# Patient Record
Sex: MALE | Race: WHITE | Employment: FULL TIME | URBAN - METROPOLITAN AREA
[De-identification: names, ages, dates, MRNs, and addresses within clinical notes are randomized per-mention and may not be internally consistent; named-entity substitution may affect disease eponyms.]

---

## 2017-12-19 ENCOUNTER — HOSPITAL ENCOUNTER (OUTPATIENT)
Dept: CARDIAC CATH/INVASIVE PROCEDURES | Age: 57
Discharge: HOME | End: 2017-12-19
Payer: COMMERCIAL

## 2017-12-19 VITALS
HEIGHT: 75 IN | TEMPERATURE: 98.1 F | OXYGEN SATURATION: 96 % | BODY MASS INDEX: 39.17 KG/M2 | DIASTOLIC BLOOD PRESSURE: 81 MMHG | WEIGHT: 315 LBS | HEART RATE: 85 BPM | RESPIRATION RATE: 14 BRPM | SYSTOLIC BLOOD PRESSURE: 133 MMHG

## 2017-12-19 DIAGNOSIS — I49.3 PVC (PREMATURE VENTRICULAR CONTRACTION): Chronic | ICD-10-CM

## 2017-12-19 DIAGNOSIS — I77.810 AORTIC ROOT DILATATION (HCC): Primary | ICD-10-CM

## 2017-12-19 DIAGNOSIS — R94.39 ABNORMAL NUCLEAR STRESS TEST: ICD-10-CM

## 2017-12-19 DIAGNOSIS — I71.21 ASCENDING AORTIC ANEURYSM: Chronic | ICD-10-CM

## 2017-12-19 DIAGNOSIS — I42.8 NONISCHEMIC CARDIOMYOPATHY (HCC): Chronic | ICD-10-CM

## 2017-12-19 DIAGNOSIS — I71.21 AORTIC ROOT ANEURYSM: Chronic | ICD-10-CM

## 2017-12-19 PROBLEM — I42.9 CARDIOMYOPATHY (HCC): Status: ACTIVE | Noted: 2017-12-19

## 2017-12-19 PROBLEM — Q25.43 AORTIC ROOT ANEURYSM: Chronic | Status: ACTIVE | Noted: 2017-12-19

## 2017-12-19 LAB
GFR NON-AFRICAN AMERICAN: >60 ML/MIN
GFR SERPL CREATININE-BSD FRML MDRD: >60 ML/MIN
GFR SERPL CREATININE-BSD FRML MDRD: NORMAL ML/MIN/{1.73_M2}
GLUCOSE BLD-MCNC: 95 MG/DL (ref 74–100)
LV EF: 25 %
LVEF MODALITY: NORMAL
PLATELET # BLD: 258 K/UL (ref 138–453)
POC CHLORIDE: 103 MMOL/L (ref 98–107)
POC CREATININE: 0.76 MG/DL (ref 0.51–1.19)
POC HEMATOCRIT: 42 % (ref 41–53)
POC HEMOGLOBIN: 14.2 G/DL (ref 13.5–17.5)
POC POTASSIUM: 4.3 MMOL/L (ref 3.5–4.5)
POC SODIUM: 140 MMOL/L (ref 138–146)

## 2017-12-19 PROCEDURE — C1769 GUIDE WIRE: HCPCS

## 2017-12-19 PROCEDURE — 85049 AUTOMATED PLATELET COUNT: CPT

## 2017-12-19 PROCEDURE — 85014 HEMATOCRIT: CPT

## 2017-12-19 PROCEDURE — 93567 NJX CAR CTH SPRVLV AORTGRPHY: CPT | Performed by: INTERNAL MEDICINE

## 2017-12-19 PROCEDURE — 7100000011 HC PHASE II RECOVERY - ADDTL 15 MIN

## 2017-12-19 PROCEDURE — C1894 INTRO/SHEATH, NON-LASER: HCPCS

## 2017-12-19 PROCEDURE — C1760 CLOSURE DEV, VASC: HCPCS

## 2017-12-19 PROCEDURE — 82947 ASSAY GLUCOSE BLOOD QUANT: CPT

## 2017-12-19 PROCEDURE — 6360000002 HC RX W HCPCS

## 2017-12-19 PROCEDURE — 84132 ASSAY OF SERUM POTASSIUM: CPT

## 2017-12-19 PROCEDURE — 82435 ASSAY OF BLOOD CHLORIDE: CPT

## 2017-12-19 PROCEDURE — 93458 L HRT ARTERY/VENTRICLE ANGIO: CPT | Performed by: INTERNAL MEDICINE

## 2017-12-19 PROCEDURE — 84295 ASSAY OF SERUM SODIUM: CPT

## 2017-12-19 PROCEDURE — C1725 CATH, TRANSLUMIN NON-LASER: HCPCS

## 2017-12-19 PROCEDURE — 7100000010 HC PHASE II RECOVERY - FIRST 15 MIN

## 2017-12-19 PROCEDURE — 82565 ASSAY OF CREATININE: CPT

## 2017-12-19 RX ORDER — ACETAMINOPHEN 325 MG/1
650 TABLET ORAL EVERY 4 HOURS PRN
Status: DISCONTINUED | OUTPATIENT
Start: 2017-12-19 | End: 2017-12-20 | Stop reason: HOSPADM

## 2017-12-19 RX ORDER — SODIUM CHLORIDE 0.9 % (FLUSH) 0.9 %
10 SYRINGE (ML) INJECTION PRN
Status: DISCONTINUED | OUTPATIENT
Start: 2017-12-19 | End: 2017-12-20 | Stop reason: HOSPADM

## 2017-12-19 RX ORDER — CARVEDILOL 3.12 MG/1
3.12 TABLET ORAL 2 TIMES DAILY
Qty: 60 TABLET | Refills: 3 | Status: ON HOLD | OUTPATIENT
Start: 2017-12-19 | End: 2021-01-21 | Stop reason: HOSPADM

## 2017-12-19 RX ORDER — ONDANSETRON 2 MG/ML
4 INJECTION INTRAMUSCULAR; INTRAVENOUS EVERY 6 HOURS PRN
Status: DISCONTINUED | OUTPATIENT
Start: 2017-12-19 | End: 2017-12-20 | Stop reason: HOSPADM

## 2017-12-19 RX ORDER — LISINOPRIL 5 MG/1
5 TABLET ORAL DAILY
Qty: 30 TABLET | Refills: 3 | Status: ON HOLD | OUTPATIENT
Start: 2017-12-19 | End: 2021-01-21 | Stop reason: HOSPADM

## 2017-12-19 RX ORDER — SODIUM CHLORIDE 0.9 % (FLUSH) 0.9 %
10 SYRINGE (ML) INJECTION EVERY 12 HOURS SCHEDULED
Status: DISCONTINUED | OUTPATIENT
Start: 2017-12-19 | End: 2017-12-20 | Stop reason: HOSPADM

## 2017-12-19 RX ORDER — LISINOPRIL 2.5 MG/1
2.5 TABLET ORAL DAILY
COMMUNITY
End: 2017-12-19

## 2017-12-19 RX ORDER — SODIUM CHLORIDE 9 MG/ML
INJECTION, SOLUTION INTRAVENOUS CONTINUOUS
Status: DISCONTINUED | OUTPATIENT
Start: 2017-12-19 | End: 2017-12-20 | Stop reason: HOSPADM

## 2017-12-19 RX ORDER — ATORVASTATIN CALCIUM 40 MG/1
40 TABLET, FILM COATED ORAL DAILY
COMMUNITY

## 2017-12-19 RX ADMIN — SODIUM CHLORIDE: 9 INJECTION, SOLUTION INTRAVENOUS at 12:50

## 2017-12-19 ASSESSMENT — PAIN SCALES - GENERAL: PAINLEVEL_OUTOF10: 0

## 2017-12-19 NOTE — PROGRESS NOTES
All discharge instructions reviewed, questions answered, paper signed and given copy. Patient discharged per ambulatory with family and belongings.

## 2017-12-19 NOTE — H&P
[facility]  Procedure History and Physical Update          Patient Name:  Jose Burton  MRN:    [id number]  YOB: 1960  Date of evaluation:  12/19/2017      Please refer to the consult note / H&P completed by Dr. Dann Gaming on 12/14/17 in the medical record and note that:       [x] I have examined the patient and reviewed the H&P/Consult and there are no changes to be made to the assessment or plan. [] I have examined the patient and reviewed the H&P/Consult and have noted the following changes:         Past Medical History:   Diagnosis Date    Arthritis     Hyperlipidemia     S/P cardiac cath 12/19/2017       Past Surgical History:   Procedure Laterality Date    JOINT REPLACEMENT Bilateral     knee       Prior to Admission medications    Medication Sig Start Date End Date Taking? Authorizing Provider   aspirin 81 MG tablet Take 81 mg by mouth daily   Yes Historical Provider, MD   atorvastatin (LIPITOR) 40 MG tablet Take 40 mg by mouth daily   Yes Historical Provider, MD       Vitals:    12/19/17 1236   BP: (!) 144/79   Pulse: 78   Resp: 18   Temp: 98.1 °F (36.7 °C)   SpO2: 96%       Office Problem List    1. TTE (10/20/17): LVEF 35-40%. Severely dilated LV. Mild to mod concentric LVH. Mid anterior hypokinesis, Inferior base and mid segments hypokinetic. Mild to mod MR. Trace to mild AI. Aortic root is mod dilated, 4.8cm. Prox, ascending thoracic aorta mildly dilated, approx 4.3cm. Aortic arch mildly dilated, 3.9cm.   2. Lexiscan (11/29/17): Negative ECG portion. Small fixed inferior defect with no suggestion of ischemia. Markedly increased LVEDV with diffuse moderately severe to severe LT ventricular hypokinesis. LVEF 33%. 3. Dyslipidemia with Lipid panel (8/11/16): Total 152, HDL 30, LDL 93, Tri 144   4. Frequent PVCs       Plan:  1. Proceed with planned coronary angiography +/- RHC. 2. Further orders to follow.      Risk and benefits of cardiac catheterization were discussed in detail. Risk of bleeding, requiring blood transfusion, vascular complication requiring surgery, renal insufficieny with need of dialysis, CVA, MI, death and anesthesia complications including intubation were discussed. Patient agrees to proceed and verbalizes understanding.         Mago Ann MD  Fellow, [address]

## 2018-01-24 ENCOUNTER — INITIAL CONSULT (OUTPATIENT)
Dept: CARDIOTHORACIC SURGERY | Age: 58
End: 2018-01-24
Payer: COMMERCIAL

## 2018-01-24 VITALS
DIASTOLIC BLOOD PRESSURE: 74 MMHG | SYSTOLIC BLOOD PRESSURE: 111 MMHG | BODY MASS INDEX: 39.17 KG/M2 | HEIGHT: 75 IN | HEART RATE: 74 BPM | WEIGHT: 315 LBS | TEMPERATURE: 97.9 F | OXYGEN SATURATION: 94 %

## 2018-01-24 DIAGNOSIS — I71.21 ASCENDING AORTIC ANEURYSM: Primary | ICD-10-CM

## 2018-01-24 PROCEDURE — 99204 OFFICE O/P NEW MOD 45 MIN: CPT | Performed by: THORACIC SURGERY (CARDIOTHORACIC VASCULAR SURGERY)

## 2018-01-24 NOTE — PROGRESS NOTES
[facility] Cardiothoracic Surgery  Consult Note    Patient's Name/Date of Birth: Mayra Little / 1960 (88 y.o.)    Date: January 24, 2018     Chief Complaint:   Chief Complaint   Patient presents with    Consultation     for aortic aneurysm        HPI: Mayra Little is a 62 y.o.  male who is being referred by Dr. oDni Coronel for Aortic root aneurysm. Apparently, he had a physical for DOT and was found to have an abnormal heartbeat. He underwent an Echo that showed low-EF and dilated AAA of 4.8 cm. He underwent a cath in December that showed EF 25% with nonobstructive CAD. [facility]    He had a CTA chest which showed his ascending aortic aneurysm to be 4.2 cm. Patient is currently asymptomatic and denies any chest pain, shortness of breath, wheezing, dizziness, presyncope/syncope. He has been feeling well and has lost over 12 lbs since last year. Past Medical History:   Diagnosis Date    Arthritis     History of alcoholism ([address]     Hyperlipidemia     S/P cardiac cath 12/19/2017     Past Surgical History:   Procedure Laterality Date    JOINT REPLACEMENT Bilateral     knee     Current Outpatient Prescriptions   Medication Sig Dispense Refill    aspirin 81 MG tablet Take 81 mg by mouth daily      atorvastatin (LIPITOR) 40 MG tablet Take 40 mg by mouth daily      lisinopril (PRINIVIL;ZESTRIL) 5 MG tablet Take 1 tablet by mouth daily 30 tablet 3    carvedilol (COREG) 3.125 MG tablet Take 1 tablet by mouth 2 times daily 60 tablet 3     No current facility-administered medications for this visit. Allergies   Allergen Reactions    Pcn [Penicillins] Anaphylaxis   Family Hx: Patient has no family history of sudden cardiac death, aneurysms or dissections.     Social History    Marital status:           Social History Main Topics    Smoking status: Never Smoker    Smokeless tobacco: Current User     Types: Chew    Alcohol use No      Comment: hx. of alcoholism

## 2018-01-24 NOTE — LETTER
 lisinopril (PRINIVIL;ZESTRIL) 5 MG tablet Take 1 tablet by mouth daily 30 tablet 3    carvedilol (COREG) 3.125 MG tablet Take 1 tablet by mouth 2 times daily 60 tablet 3     No current facility-administered medications for this visit. Allergies   Allergen Reactions    Pcn [Penicillins] Anaphylaxis   Family Hx: Patient has no family history of sudden cardiac death, aneurysms or dissections. Social History    Marital status:           Social History Main Topics    Smoking status: Never Smoker    Smokeless tobacco: Current User     Types: Chew    Alcohol use No      Comment: hx. of alcoholism    Drug use: No    Sexual activity: Yes     Other Topics Concern    Not on file     Social History Narrative    No narrative on file     ROS:   CONSTITUTIONAL:  Denies: fever, weakness, fatigue, weight loss  Respiratory: negative  Cardiovascular: negative  Gastrointestinal: negative  Genitourinary:negative  Hematologic/lymphatic: negative  Musculoskeletal:negative  Neurological: negative  Endocrine: negative    Physical Exam:  Vitals:    01/24/18 0847   BP: 111/74   Pulse: 74   Temp: 97.9 °F (36.6 °C)   SpO2: 94%     Weight: Weight: (!) 318 lb (144.2 kg)    Weight: (!) 318 lb (144.2 kg)       TTE 10/20/2017:  LVEF 35-40%. Severely dilated LV. Mild to mod concentric LVH. Mid anterior hypokinesis, Inferior base and mid segments hypokinetic. Mild to mod MR. Trace to mild AI. Aortic root is mod dilated, 4.8cm. Prox, ascending thoracic aorta mildly dilated, approx 4.3cm. Aortic arch mildly dilated, 3.9cm. Cath 12/19/2017:  LMCA: very short and essentially absent. LAD and LCX have separate ostia    LAD: has mid 10-20% stenosis. LCx:  normal.    The OM1 is normal.    RCA: has mid 10-20% stenosis.     The LV gram was performed in the MONTOYA 30 position. LVEF: 25%. LV Wall Motion: moderate global hypokinesis    Conclusions:  1. Minimal coronary artery disease. 2. Moderate to severe LV systolic dysfunction; LVEF 25%. 3. Aortic root aneurysm. TTE showed 4.8 cm. CTA Chest 12/29/2017:  Presence of 4.2 cm aneurysm involving the ascending aorta as described. General: Alert and Oriented x3. Ambulatory. No apparent distress. HEENT:  Normocephalic and atraumatic. Neck:  Supple. Trachea midline. Chest:  No abnormality. Equal and symmetric expansion with respiration. Lungs:  Clear to auscultation. Cardiac:  Regular rate and rhythm without murmurs, rubs or gallops. Abdomen:  Soft, non-tender, normoactive bowel sounds. No masses or organomegaly. Extremities:  No edema. Intact pulses in all four extremities. Musculoskeletal:  Intact range of motion of peripheral joints. Normal muscular strength. Psychiatric: Mood and affect are appropriate. Assessment:  Patient Active Problem List   Diagnosis    Nonischemic cardiomyopathy ([address]    Abnormal nuclear stress test    PVC (premature ventricular contraction)    Aortic root dilatation (HCC)    Aortic root aneurysm ([address]    Ascending aortic aneurysm ([address]   1. Nonischemic Cardiomyopathy  2. Aortic root aneurysm    Plan:  1. No surgical intervention at this time  2. Continue optimal medical therapy  3. Needs continued monitoring with repeat CTA chest in 1 year  4. Continue further treatment per Cardiology  5. Follow up with Dr. Ronni Lopez as scheduled    The above recommendations including medications and orders were discussed and agreed upon with Dr. Iain Clarke. Electronically signed on 01/24/18 at 10:22 AM by:    Sulma Dominique MD   Fellow, Cardiovascular Diseases on CT Surgery service  [facility]    Pt was seen and examined by me and I agree with findings, plan of treatment and documentation. I have made necessary changes where needed. If you have questions, please do not hesitate to call me. I look forward to following Catia Barton along with you.     Sincerely, Bethany Singh MD

## 2021-01-19 ENCOUNTER — DIRECT ADMIT ORDERS (OUTPATIENT)
Dept: INTERNAL MEDICINE CLINIC | Age: 61
End: 2021-01-19

## 2021-01-19 PROBLEM — I48.91 ATRIAL FIBRILLATION WITH RVR (HCC): Status: ACTIVE | Noted: 2021-01-19

## 2021-01-19 RX ORDER — NICOTINE 21 MG/24HR
1 PATCH, TRANSDERMAL 24 HOURS TRANSDERMAL DAILY PRN
Status: CANCELLED | OUTPATIENT
Start: 2021-01-19

## 2021-01-19 RX ORDER — ACETAMINOPHEN 325 MG/1
650 TABLET ORAL EVERY 6 HOURS PRN
Status: CANCELLED | OUTPATIENT
Start: 2021-01-19

## 2021-01-19 RX ORDER — SODIUM CHLORIDE 0.9 % (FLUSH) 0.9 %
10 SYRINGE (ML) INJECTION EVERY 12 HOURS SCHEDULED
Status: CANCELLED | OUTPATIENT
Start: 2021-01-19

## 2021-01-19 RX ORDER — SODIUM CHLORIDE 0.9 % (FLUSH) 0.9 %
10 SYRINGE (ML) INJECTION PRN
Status: CANCELLED | OUTPATIENT
Start: 2021-01-19

## 2021-01-19 RX ORDER — POLYETHYLENE GLYCOL 3350 17 G/17G
17 POWDER, FOR SOLUTION ORAL DAILY PRN
Status: CANCELLED | OUTPATIENT
Start: 2021-01-19

## 2021-01-19 RX ORDER — ONDANSETRON 2 MG/ML
4 INJECTION INTRAMUSCULAR; INTRAVENOUS EVERY 6 HOURS PRN
Status: CANCELLED | OUTPATIENT
Start: 2021-01-19

## 2021-01-19 RX ORDER — ACETAMINOPHEN 650 MG/1
650 SUPPOSITORY RECTAL EVERY 6 HOURS PRN
Status: CANCELLED | OUTPATIENT
Start: 2021-01-19

## 2021-01-19 RX ORDER — PROMETHAZINE HYDROCHLORIDE 12.5 MG/1
12.5 TABLET ORAL EVERY 6 HOURS PRN
Status: CANCELLED | OUTPATIENT
Start: 2021-01-19

## 2021-01-20 ENCOUNTER — HOSPITAL ENCOUNTER (INPATIENT)
Age: 61
LOS: 2 days | Discharge: HOME | DRG: 308 | End: 2021-01-22
Attending: INTERNAL MEDICINE | Admitting: INTERNAL MEDICINE
Payer: COMMERCIAL

## 2021-01-20 ENCOUNTER — APPOINTMENT (OUTPATIENT)
Dept: GENERAL RADIOLOGY | Age: 61
DRG: 308 | End: 2021-01-20
Attending: INTERNAL MEDICINE
Payer: COMMERCIAL

## 2021-01-20 DIAGNOSIS — I50.20 HFREF (HEART FAILURE WITH REDUCED EJECTION FRACTION) (HCC): ICD-10-CM

## 2021-01-20 DIAGNOSIS — I48.91 ATRIAL FIBRILLATION WITH RVR (HCC): Primary | ICD-10-CM

## 2021-01-20 DIAGNOSIS — I42.8 NONISCHEMIC CARDIOMYOPATHY (HCC): Chronic | ICD-10-CM

## 2021-01-20 PROBLEM — E66.9 OBESITY (BMI 30-39.9): Status: ACTIVE | Noted: 2021-01-20

## 2021-01-20 PROBLEM — G47.33 OSA ON CPAP: Status: ACTIVE | Noted: 2021-01-20

## 2021-01-20 PROBLEM — I10 ESSENTIAL HYPERTENSION: Status: ACTIVE | Noted: 2021-01-20

## 2021-01-20 PROBLEM — Z99.89 OSA ON CPAP: Status: ACTIVE | Noted: 2021-01-20

## 2021-01-20 LAB
ALBUMIN SERPL-MCNC: 3.6 G/DL (ref 3.5–5.2)
ALBUMIN/GLOBULIN RATIO: 1.2 (ref 1–2.5)
ALP BLD-CCNC: 79 U/L (ref 40–129)
ALT SERPL-CCNC: 18 U/L (ref 5–41)
ANION GAP SERPL CALCULATED.3IONS-SCNC: 15 MMOL/L (ref 9–17)
AST SERPL-CCNC: 15 U/L
BILIRUB SERPL-MCNC: 0.42 MG/DL (ref 0.3–1.2)
BNP INTERPRETATION: ABNORMAL
BUN BLDV-MCNC: 19 MG/DL (ref 8–23)
BUN/CREAT BLD: ABNORMAL (ref 9–20)
CALCIUM SERPL-MCNC: 9 MG/DL (ref 8.6–10.4)
CHLORIDE BLD-SCNC: 107 MMOL/L (ref 98–107)
CO2: 19 MMOL/L (ref 20–31)
CREAT SERPL-MCNC: 0.82 MG/DL (ref 0.7–1.2)
GFR AFRICAN AMERICAN: >60 ML/MIN
GFR NON-AFRICAN AMERICAN: >60 ML/MIN
GFR SERPL CREATININE-BSD FRML MDRD: ABNORMAL ML/MIN/{1.73_M2}
GFR SERPL CREATININE-BSD FRML MDRD: ABNORMAL ML/MIN/{1.73_M2}
GLUCOSE BLD-MCNC: 114 MG/DL (ref 70–99)
HCT VFR BLD CALC: 42.5 % (ref 40.7–50.3)
HEMOGLOBIN: 13.3 G/DL (ref 13–17)
INR BLD: 1.1
MAGNESIUM: 2 MG/DL (ref 1.6–2.6)
MCH RBC QN AUTO: 27.9 PG (ref 25.2–33.5)
MCHC RBC AUTO-ENTMCNC: 31.3 G/DL (ref 28.4–34.8)
MCV RBC AUTO: 89.3 FL (ref 82.6–102.9)
NRBC AUTOMATED: 0 PER 100 WBC
PARTIAL THROMBOPLASTIN TIME: 28 SEC (ref 20.5–30.5)
PDW BLD-RTO: 12.4 % (ref 11.8–14.4)
PLATELET # BLD: 226 K/UL (ref 138–453)
PMV BLD AUTO: 10.8 FL (ref 8.1–13.5)
POTASSIUM SERPL-SCNC: 4 MMOL/L (ref 3.7–5.3)
PRO-BNP: 2351 PG/ML
PROTHROMBIN TIME: 11.6 SEC (ref 9–12)
RBC # BLD: 4.76 M/UL (ref 4.21–5.77)
SARS-COV-2, RAPID: NORMAL
SARS-COV-2: NORMAL
SARS-COV-2: NOT DETECTED
SODIUM BLD-SCNC: 141 MMOL/L (ref 135–144)
SOURCE: NORMAL
TOTAL PROTEIN: 6.7 G/DL (ref 6.4–8.3)
TROPONIN INTERP: NORMAL
TROPONIN INTERP: NORMAL
TROPONIN T: NORMAL NG/ML
TROPONIN T: NORMAL NG/ML
TROPONIN, HIGH SENSITIVITY: 13 NG/L (ref 0–22)
TROPONIN, HIGH SENSITIVITY: 14 NG/L (ref 0–22)
TSH SERPL DL<=0.05 MIU/L-ACNC: 1.74 MIU/L (ref 0.3–5)
WBC # BLD: 9.4 K/UL (ref 3.5–11.3)

## 2021-01-20 PROCEDURE — 85730 THROMBOPLASTIN TIME PARTIAL: CPT

## 2021-01-20 PROCEDURE — 99223 1ST HOSP IP/OBS HIGH 75: CPT | Performed by: INTERNAL MEDICINE

## 2021-01-20 PROCEDURE — 2580000003 HC RX 258: Performed by: NURSE PRACTITIONER

## 2021-01-20 PROCEDURE — 80053 COMPREHEN METABOLIC PANEL: CPT

## 2021-01-20 PROCEDURE — 6360000002 HC RX W HCPCS: Performed by: STUDENT IN AN ORGANIZED HEALTH CARE EDUCATION/TRAINING PROGRAM

## 2021-01-20 PROCEDURE — 6370000000 HC RX 637 (ALT 250 FOR IP): Performed by: NURSE PRACTITIONER

## 2021-01-20 PROCEDURE — 84484 ASSAY OF TROPONIN QUANT: CPT

## 2021-01-20 PROCEDURE — 6360000002 HC RX W HCPCS: Performed by: INTERNAL MEDICINE

## 2021-01-20 PROCEDURE — 83880 ASSAY OF NATRIURETIC PEPTIDE: CPT

## 2021-01-20 PROCEDURE — U0003 INFECTIOUS AGENT DETECTION BY NUCLEIC ACID (DNA OR RNA); SEVERE ACUTE RESPIRATORY SYNDROME CORONAVIRUS 2 (SARS-COV-2) (CORONAVIRUS DISEASE [COVID-19]), AMPLIFIED PROBE TECHNIQUE, MAKING USE OF HIGH THROUGHPUT TECHNOLOGIES AS DESCRIBED BY CMS-2020-01-R: HCPCS

## 2021-01-20 PROCEDURE — 83735 ASSAY OF MAGNESIUM: CPT

## 2021-01-20 PROCEDURE — 85610 PROTHROMBIN TIME: CPT

## 2021-01-20 PROCEDURE — 85027 COMPLETE CBC AUTOMATED: CPT

## 2021-01-20 PROCEDURE — U0005 INFEC AGEN DETEC AMPLI PROBE: HCPCS

## 2021-01-20 PROCEDURE — 2580000003 HC RX 258: Performed by: STUDENT IN AN ORGANIZED HEALTH CARE EDUCATION/TRAINING PROGRAM

## 2021-01-20 PROCEDURE — 99253 IP/OBS CNSLTJ NEW/EST LOW 45: CPT | Performed by: THORACIC SURGERY (CARDIOTHORACIC VASCULAR SURGERY)

## 2021-01-20 PROCEDURE — 36415 COLL VENOUS BLD VENIPUNCTURE: CPT

## 2021-01-20 PROCEDURE — 93005 ELECTROCARDIOGRAM TRACING: CPT | Performed by: STUDENT IN AN ORGANIZED HEALTH CARE EDUCATION/TRAINING PROGRAM

## 2021-01-20 PROCEDURE — 71045 X-RAY EXAM CHEST 1 VIEW: CPT

## 2021-01-20 PROCEDURE — 84443 ASSAY THYROID STIM HORMONE: CPT

## 2021-01-20 PROCEDURE — 2060000000 HC ICU INTERMEDIATE R&B

## 2021-01-20 PROCEDURE — APPSS45 APP SPLIT SHARED TIME 31-45 MINUTES: Performed by: NURSE PRACTITIONER

## 2021-01-20 RX ORDER — HEPARIN SODIUM 10000 [USP'U]/100ML
5-30 INJECTION, SOLUTION INTRAVENOUS CONTINUOUS
Status: DISCONTINUED | OUTPATIENT
Start: 2021-01-20 | End: 2021-01-20

## 2021-01-20 RX ORDER — HEPARIN SODIUM 1000 [USP'U]/ML
60 INJECTION, SOLUTION INTRAVENOUS; SUBCUTANEOUS ONCE
Status: DISCONTINUED | OUTPATIENT
Start: 2021-01-20 | End: 2021-01-20

## 2021-01-20 RX ORDER — LISINOPRIL 5 MG/1
5 TABLET ORAL DAILY
Status: DISCONTINUED | OUTPATIENT
Start: 2021-01-20 | End: 2021-01-20

## 2021-01-20 RX ORDER — TRIAMCINOLONE ACETONIDE 1 MG/ML
LOTION TOPICAL 2 TIMES DAILY
COMMUNITY
End: 2021-02-25 | Stop reason: ALTCHOICE

## 2021-01-20 RX ORDER — ATORVASTATIN CALCIUM 40 MG/1
40 TABLET, FILM COATED ORAL DAILY
Status: DISCONTINUED | OUTPATIENT
Start: 2021-01-20 | End: 2021-01-22 | Stop reason: HOSPADM

## 2021-01-20 RX ORDER — NICOTINE 21 MG/24HR
1 PATCH, TRANSDERMAL 24 HOURS TRANSDERMAL DAILY PRN
Status: DISCONTINUED | OUTPATIENT
Start: 2021-01-20 | End: 2021-01-22 | Stop reason: HOSPADM

## 2021-01-20 RX ORDER — METOPROLOL TARTRATE 50 MG/1
50 TABLET, FILM COATED ORAL 2 TIMES DAILY
Status: DISCONTINUED | OUTPATIENT
Start: 2021-01-20 | End: 2021-01-20

## 2021-01-20 RX ORDER — ONDANSETRON 2 MG/ML
4 INJECTION INTRAMUSCULAR; INTRAVENOUS EVERY 6 HOURS PRN
Status: DISCONTINUED | OUTPATIENT
Start: 2021-01-20 | End: 2021-01-22 | Stop reason: HOSPADM

## 2021-01-20 RX ORDER — ACETAMINOPHEN 325 MG/1
650 TABLET ORAL EVERY 6 HOURS PRN
Status: DISCONTINUED | OUTPATIENT
Start: 2021-01-20 | End: 2021-01-22 | Stop reason: HOSPADM

## 2021-01-20 RX ORDER — FUROSEMIDE 10 MG/ML
40 INJECTION INTRAMUSCULAR; INTRAVENOUS 2 TIMES DAILY
Status: DISCONTINUED | OUTPATIENT
Start: 2021-01-20 | End: 2021-01-22

## 2021-01-20 RX ORDER — HEPARIN SODIUM 1000 [USP'U]/ML
30 INJECTION, SOLUTION INTRAVENOUS; SUBCUTANEOUS PRN
Status: DISCONTINUED | OUTPATIENT
Start: 2021-01-20 | End: 2021-01-20 | Stop reason: ALTCHOICE

## 2021-01-20 RX ORDER — SODIUM CHLORIDE 0.9 % (FLUSH) 0.9 %
10 SYRINGE (ML) INJECTION EVERY 12 HOURS SCHEDULED
Status: DISCONTINUED | OUTPATIENT
Start: 2021-01-20 | End: 2021-01-22 | Stop reason: HOSPADM

## 2021-01-20 RX ORDER — FUROSEMIDE 10 MG/ML
40 INJECTION INTRAMUSCULAR; INTRAVENOUS ONCE
Status: DISCONTINUED | OUTPATIENT
Start: 2021-01-20 | End: 2021-01-20

## 2021-01-20 RX ORDER — HEPARIN SODIUM 1000 [USP'U]/ML
60 INJECTION, SOLUTION INTRAVENOUS; SUBCUTANEOUS PRN
Status: DISCONTINUED | OUTPATIENT
Start: 2021-01-20 | End: 2021-01-20 | Stop reason: ALTCHOICE

## 2021-01-20 RX ORDER — PROMETHAZINE HYDROCHLORIDE 12.5 MG/1
12.5 TABLET ORAL EVERY 6 HOURS PRN
Status: DISCONTINUED | OUTPATIENT
Start: 2021-01-20 | End: 2021-01-22 | Stop reason: HOSPADM

## 2021-01-20 RX ORDER — SODIUM CHLORIDE 0.9 % (FLUSH) 0.9 %
10 SYRINGE (ML) INJECTION PRN
Status: DISCONTINUED | OUTPATIENT
Start: 2021-01-20 | End: 2021-01-22 | Stop reason: HOSPADM

## 2021-01-20 RX ORDER — M-VIT,TX,IRON,MINS/CALC/FOLIC 27MG-0.4MG
1 TABLET ORAL DAILY
Status: DISCONTINUED | OUTPATIENT
Start: 2021-01-20 | End: 2021-01-22 | Stop reason: HOSPADM

## 2021-01-20 RX ORDER — POLYETHYLENE GLYCOL 3350 17 G/17G
17 POWDER, FOR SOLUTION ORAL DAILY PRN
Status: DISCONTINUED | OUTPATIENT
Start: 2021-01-20 | End: 2021-01-22 | Stop reason: HOSPADM

## 2021-01-20 RX ORDER — ACETAMINOPHEN 650 MG/1
650 SUPPOSITORY RECTAL EVERY 6 HOURS PRN
Status: DISCONTINUED | OUTPATIENT
Start: 2021-01-20 | End: 2021-01-22 | Stop reason: HOSPADM

## 2021-01-20 RX ORDER — M-VIT,TX,IRON,MINS/CALC/FOLIC 27MG-0.4MG
1 TABLET ORAL DAILY
COMMUNITY

## 2021-01-20 RX ADMIN — APIXABAN 5 MG: 5 TABLET, FILM COATED ORAL at 09:28

## 2021-01-20 RX ADMIN — SODIUM CHLORIDE, PRESERVATIVE FREE 10 ML: 5 INJECTION INTRAVENOUS at 21:11

## 2021-01-20 RX ADMIN — FUROSEMIDE 40 MG: 10 INJECTION, SOLUTION INTRAMUSCULAR; INTRAVENOUS at 15:00

## 2021-01-20 RX ADMIN — MULTIPLE VITAMINS W/ MINERALS TAB 1 TABLET: TAB at 09:28

## 2021-01-20 RX ADMIN — FUROSEMIDE 40 MG: 10 INJECTION, SOLUTION INTRAMUSCULAR; INTRAVENOUS at 21:09

## 2021-01-20 RX ADMIN — AMIODARONE HYDROCHLORIDE 0.5 MG/MIN: 50 INJECTION, SOLUTION INTRAVENOUS at 04:59

## 2021-01-20 RX ADMIN — APIXABAN 5 MG: 5 TABLET, FILM COATED ORAL at 21:10

## 2021-01-20 RX ADMIN — LISINOPRIL 5 MG: 5 TABLET ORAL at 09:28

## 2021-01-20 RX ADMIN — METOPROLOL TARTRATE 25 MG: 25 TABLET ORAL at 21:40

## 2021-01-20 RX ADMIN — DESMOPRESSIN ACETATE 40 MG: 0.2 TABLET ORAL at 09:28

## 2021-01-20 ASSESSMENT — ENCOUNTER SYMPTOMS
COLOR CHANGE: 0
DIARRHEA: 0
ABDOMINAL DISTENTION: 0
VOMITING: 0
CHEST TIGHTNESS: 1
SINUS PRESSURE: 1
CONSTIPATION: 0
PHOTOPHOBIA: 0
SHORTNESS OF BREATH: 1
WHEEZING: 0
COUGH: 0
NAUSEA: 0
SORE THROAT: 0
TROUBLE SWALLOWING: 0
ABDOMINAL PAIN: 0

## 2021-01-20 ASSESSMENT — PAIN SCALES - GENERAL
PAINLEVEL_OUTOF10: 0

## 2021-01-20 NOTE — CONSULTS
[facility] Cardiothoracic Surgery  Consult    Patient's Name/Date of Birth: Silverio Hamm / 1960 (36 y.o.)    Date: January 20, 2021     Chief Complaint: No chief complaint on file. HPI: Silverio Hamm is a 64 y.o.  male patient presented from [facility] with shortness of breath and feeling of a panic attack. Patient noted in emergency department to have A. fib. No history of A. fib. CT scan was done to rule out PE which was negative. Echocardiogram noted to show low EF of 15% with moderate TR.      ROS:   CONSTITUTIONAL:   Respiratory: negative  Cardiovascular: negative  Gastrointestinal: negative  Genitourinary:negative  Hematologic/lymphatic: negative  Musculoskeletal:negative  Neurological: negative  Endocrine: negative    Past Medical History:   Diagnosis Date    Arthritis     Congestive heart failure (HCC)     [facility] of [facility]ism [address]     Hyperlipidemia     S/P cardiac cath 12/19/2017    Sleep apnea      Past Surgical History:   Procedure Laterality Date    JOINT REPLACEMENT Bilateral     knee     Allergies   Allergen Reactions    Pcn [Penicillins] Anaphylaxis     No family history on file.   Social History     Socioeconomic History    Marital status:      Spouse name: Not on file    Number of children: Not on file    Years of education: Not on file    Highest education level: Not on file   Occupational History    Not on file   Social Needs    Financial resource strain: Not on file    Food insecurity     Worry: Not on file     Inability: Not on file    Transportation needs     Medical: Not on file     Non-medical: Not on file   Tobacco Use    Smoking status: Never Smoker    Smokeless tobacco: Former User     Types: Chew   Substance and Sexual Activity    Alcohol use: No     Comment: hx. of alcoholism    Drug use: No    Sexual activity: Yes   Lifestyle    Physical activity     Days per week: Not on file     Minutes per session: Not on file    Stress: Not on file   Relationships    Social connections     Talks on phone: Not on file     Gets together: Not on file     Attends Mosque service: Not on file     Active member of club or organization: Not on file     Attends meetings of clubs or organizations: Not on file     Relationship status: Not on file    Intimate partner violence     Fear of current or ex partner: Not on file     Emotionally abused: Not on file     Physically abused: Not on file     Forced sexual activity: Not on file   Other Topics Concern    Not on file   Social History Narrative    Not on file       Current Facility-Administered Medications   Medication Dose Route Frequency Provider Last Rate Last Admin    apixaban (ELIQUIS) tablet 5 mg  5 mg Oral BID MALENA Morris - CNP   5 mg at 01/20/21 7051    acetaminophen (TYLENOL) tablet 650 mg  650 mg Oral Q6H PRN MALENA Morris CNP        Or    acetaminophen (TYLENOL) suppository 650 mg  650 mg Rectal Q6H PRN MALENA Morris - CNP        nicotine (NICODERM CQ) 21 MG/24HR 1 patch  1 patch Transdermal Daily PRN MALENA Morris - ANDRES        polyethylene glycol (GLYCOLAX) packet 17 g  17 g Oral Daily PRN Vahid Kennedy APRN - ANDRES        promethazine (PHENERGAN) tablet 12.5 mg  12.5 mg Oral Q6H PRN MALENA Morris - CNP        Or    ondansetron (ZOFRAN) injection 4 mg  4 mg Intravenous Q6H PRN MALENA Morris - CNP        sodium chloride flush 0.9 % injection 10 mL  10 mL Intravenous 2 times per day MALENA Morris - CNP        sodium chloride flush 0.9 % injection 10 mL  10 mL Intravenous PRN Vahid Kennedy APRN - CNP        atorvastatin (LIPITOR) tablet 40 mg  40 mg Oral Daily Lokatena JIM MontezN - NP   40 mg at 01/20/21 6675    therapeutic multivitamin-minerals 1 tablet  1 tablet Oral Daily Sujatana JIM MontezN - NP   1 tablet at 01/20/21 0916    furosemide (LASIX) injection 40 mg  40 mg determine multivessel CAD and rule out ischemic cardiomyopathy  ~At this time there is no planned interventions regarding the dilated aortic root. Again, we advise patient get a cardiac catheterization  ~Continue with aggressive diuresis Lasix 40 mg twice daily  ~Follow-up in clinic to review findings and see how you are managing her heart failure on February 25 at 10 AM.    Agree with above  Patient is stable with mild heart failure symptoms  Patient gets short of breath with walking  Vital signs stable afebrile  Heart irregular rate A. fib  Lungs are rhonchorous  Abdomen obese  Neurologically intact  Extremities mild edema  No lymphatic normal    Plan to repeat CT scan in 1 year  Continue blood pressure medication heart failure managed medications  Encourage patient to exercise daily and follow-up with cardiology  Patient to follow-up with me within 2 to 4 weeks  Have discussed the symptoms of rupture including chest and back pain and need to come to the emergency room immediately  Risk, benefits, and the intentions were discussed with the patient.       Fiona Freedman CNP  Phone: [phone]

## 2021-01-20 NOTE — PLAN OF CARE
Problem: Falls - Risk of:  Goal: Will remain free from falls  Description: Will remain free from falls  Outcome: Ongoing  Goal: Absence of physical injury  Description: Absence of physical injury  Outcome: Ongoing     Problem: Cardiac:  Goal: Ability to maintain an adequate cardiac output will improve  Description: Ability to maintain an adequate cardiac output will improve  Outcome: Ongoing  Goal: Complications related to the disease process, condition or treatment will be avoided or minimized  Description: Complications related to the disease process, condition or treatment will be avoided or minimized  Outcome: Ongoing

## 2021-01-20 NOTE — PROGRESS NOTES
[facility]  Documentation Note                Admission Dx: Atrial fibrillation with RVR ([facility] [address] [I48.91]    Past Medical History:   has a past medical history of Arthritis, Congestive heart failure ([facility] [address] [facility] Hyperlipidemia, S/P cardiac cath, and Sleep apnea. Previous Testing:     ECHO 1/19/2021: EF 10-15%, trace-mild AR, mild MR, moderate TR, RVSP is 45 mmHg. CATH 12/19/17:    Minimal coronary artery disease. Moderate to severe LV systolic dysfunction; LVEF 25%. Aortic root aneurysm. TTE showed 4.8 cm. Previous office/hospital visit:   Dr. Tan Dys 1/9/2020:   1. TTE (10/20/17): LVEF 35-40%. Severely dilated LV. Mild to mod concentric LVH. Mid anterior hypokinesis, Inferior base and mid segments hypokinetic. Mild to mod MR. Trace to mild AI. Aortic root is mod dilated, 4.8cm. Prox, ascending thoracic aorta mildly dilated, approx 4.3cm. Aortic arch mildly dilated, 3.9cm.  2. Lexiscan (11/29/17): Negative ECG portion. Small fixed inferior defect with no suggestion of ischemia. Markedly increased LVEDP with diffuse moderately severe to severe LT ventricular hypokinesis. LVEF 33%. 3. Dyslipidemia with Lipid panel (8/11/16): Total 152, HDL 30, LDL 93, Tri 144  4. Holter monitoring (12/18/2017): Sinus rhythm with sinus tachycardia. Maximum heart rate of 140 bpm. Rare PACs with pairs. Nonsustained paroxysmal atrial tachycardia with 5-beat run. Rare PVC and couplet. One ventricular triplet. 5. Cardiac catheterization (12/19/2017): Minimal coronary artery disease. Moderate to severe LV systolic dysfunction. LV ejection fraction 25%. Aortic root aneurysm noted. 6. CT angiogram chest (12/29/2017): 4.2 cm ascending aortic aneurysm. 7. TTE (8/8/18): LVEF 40%, global hypokinesia, mild MR, mild TR, mild AI, dilated aortic root 5.1 cm    Plan --   Matthew Lilliam denies having any symptoms which are construed toward angina or congestive heart failure.  He'll continue aspirin for secondary prevention. Blood pressure is stable on Coreg and lisinopril. His repeat blood pressure was 128/78. He will continue Lipitor for history of hyperlipidemia. I will obtain lipid profile. He has ascending aortic dilatation/aneurysm. A repeat CT with contrast will be obtained. His symptoms of choking sensation after meal is possibly indicative of esophageal pathology. I recommended referral to gastroenterology. He wants to discuss this with primary care provider first. I discussed with him to quit chewing tobacco immediately. Return to clinic in 12 months' time for followup.     Mazin Fung, [facility]

## 2021-01-20 NOTE — H&P
[facility]  Office: [address], DO, Alvarez Jc, DO, Blank Garcia, DO, [facility], DO, Kev Aguirre MD, Bethany Morin MD, Ian Koenig MD, Ruy Vizcarra MD, Issac Juárez MD, Talib Becker MD, Michelle Cristina MD, Patricio Bumpers, MD, Elvia Cohen MD, Wayne Montemayor DO, Cynthia Larry MD, Terri Prieto MD, Bayron Suarez DO, Troy Parsons MD,  Eric Rae, DO, Matthew Gilbert MD, Rob Sneed MD, Nilam Gresham [facility], [facility] [place], CNP, Elmer Thomas, CNP, Maragrito Miller, CNS, Baldomero Green, CNP, Kasey Devlin, CNP, Rupinder Welsh, CNP, Dulce Maria Gillette, CNP, Rima Mabry CNP, Meggan Michel PA-C, Marcello Kayser, [facility], Nilson Jenkins, CNP, Denver Macadam, [facility], Ritika Mercer, [facility], Reza Pa, CNP, Ray Renae, [address]    HISTORY AND PHYSICAL EXAMINATION            Date:   1/20/2021  Patient name:  Pasquale Councilman  Date of admission:  1/20/2021  4:14 AM  MRN:   [id number]  Account:  [de-identified]  YOB: 1960  PCP:    Amrita Barone DO  Room:   [address] [place] [address]-  Code Status:    Full Code    Chief Complaint:     Chest pressure and dyspnea    History Obtained From:     patient, electronic medical record    History of Present Illness:     Pasquale Councilman is a 64 y.o. Non-/non  male who presents with No chief complaint on file. and is admitted to the hospital for the management of Atrial fibrillation with RVR ([facility] [address] This is a 64 yr old male with history of cardiomyopathy, MIRIAM on CPAP, and known ascending thoracic aortic aneurysm (last measured 4.2cmx 4.1cm 1yr ago), HTN, and HLD who presents initially to [facility]. ER with complaints of persistent mid sternal/ non-radiating chest pressure and sudden onset dyspnea that started 4 days ago.   Patient began noticing symptoms upon waking up in the middle of the night gasping for air feeling short of breath despite wearing CPAP. He denies any aggravating or alleviating factors, symptoms have been persistent both with activity and at rest. He denies any HA/dizziness/light headedness, ABD pain, n/v/d, cough, fevers, or BLE swelling. On arrival to outlying ER, patient noted to have new onset AFib RVR with HR: 130s. Cardiology was consulted, and patient was given 1 dose 12.5mg Coreg, Dixogin 0.5mcg, and started on amiodarone gtt. He was also started on Eliquis for anticoagulation. Presenting labs were largely unremarkable outside of D-Dimer: 1.2 and Troponins negative. CTA Chest negative for PE with small bilateral pleural effusions. 2D Echo also completed and noted to have significant decline in EF: 10-15% (Prevous ECHO with EF: 40%). Given these findings, patient is transferred to our facility for further workup and management. On my evaluation, patient is resting in bed without acute distress. Currently denies CP, SOB, palpitations, HA/dizziness/light headedness. Remains on amiodarone gtt in AFib with frequent PVCs, with rate controlled 90s to low 100s. Past Medical History:     Past Medical History:   Diagnosis Date    Arthritis     Congestive heart failure ([facility] [address]     History of alcoholism ([facility] [address]     Hyperlipidemia     S/P cardiac cath 12/19/2017    Sleep apnea         Past Surgical History:     Past Surgical History:   Procedure Laterality Date    JOINT REPLACEMENT Bilateral     knee        Medications Prior to Admission:     Prior to Admission medications    Medication Sig Start Date End Date Taking? Authorizing Provider   Multiple Vitamins-Minerals (THERAPEUTIC MULTIVITAMIN-MINERALS) tablet Take 1 tablet by mouth daily   Yes Historical Provider, MD   triamcinolone (KENALOG) 0.1 % lotion Apply topically 2 times daily Apply topically 3 times daily.    Yes Historical Provider, MD   aspirin 81 MG tablet Take 81 mg by mouth daily   Yes Historical Provider, MD   atorvastatin (LIPITOR) 40 MG tablet Take 40 mg by mouth daily   Yes Historical Provider, MD   lisinopril (PRINIVIL;ZESTRIL) 5 MG tablet Take 1 tablet by mouth daily 17  Yes Justin Small MD   carvedilol (COREG) 3.125 MG tablet Take 1 tablet by mouth 2 times daily 17  Yes Justin Small MD        Allergies:     Pcn [penicillins]    Social History:     Tobacco:    reports that he has never smoked. He has quit using smokeless tobacco.  His smokeless tobacco use included chew. Alcohol:      reports no history of alcohol use. Drug Use:  reports no history of drug use. Family History:     No family history on file. Review of Systems:     Positive and Negative as described in HPI. Review of Systems   Constitutional: Positive for activity change. Negative for diaphoresis, fatigue, fever and unexpected weight change. HENT: Positive for sinus pressure. Negative for sore throat and trouble swallowing. Eyes: Negative for photophobia and visual disturbance. Respiratory: Positive for chest tightness and shortness of breath. Negative for cough and wheezing. Cardiovascular: Positive for chest pain. Negative for palpitations and leg swelling. Gastrointestinal: Negative for abdominal distention, abdominal pain, constipation, diarrhea, nausea and vomiting. Endocrine: Negative for polyphagia and polyuria. Genitourinary: Negative for difficulty urinating, hematuria and urgency. Musculoskeletal: Negative for arthralgias, myalgias, neck pain and neck stiffness. Skin: Negative for color change. Neurological: Negative for dizziness, syncope, weakness, light-headedness and headaches. Psychiatric/Behavioral: Negative for agitation, behavioral problems and confusion. The patient is not nervous/anxious.         Physical Exam:   BP (!) 117/99   Pulse 98   Temp 98.3 °F (36.8 °C) (Oral)   Resp 16   Ht 6' 3\" (1.905 m)   Wt (!) 318 lb 12.8 oz (144.6 kg)   BMI 39.85 kg/m²   Temp (24hrs), Av.3 °F (36.8 °C), Min:98.3 °F (36.8 °C), Max:98.3 °F (36.8 °C)    No results for input(s): POCGLU in the last 72 hours. No intake or output data in the 24 hours ending 01/20/21 0554    Physical Exam  Vitals signs and nursing note reviewed. Constitutional:       General: He is not in acute distress. Appearance: Normal appearance. He is obese. He is not toxic-appearing or diaphoretic. HENT:      Head: Normocephalic and atraumatic. Right Ear: External ear normal.      Left Ear: External ear normal.      Nose: Nose normal. No congestion or rhinorrhea. Mouth/Throat:      Mouth: Mucous membranes are dry. Pharynx: Oropharynx is clear. Eyes:      Extraocular Movements: Extraocular movements intact. Conjunctiva/sclera: Conjunctivae normal.      Pupils: Pupils are equal, round, and reactive to light. Neck:      Musculoskeletal: Normal range of motion and neck supple. No neck rigidity or muscular tenderness. Cardiovascular:      Rate and Rhythm: Tachycardia present. Rhythm irregular. Pulses: Normal pulses. Heart sounds: Normal heart sounds. No murmur. No friction rub. No gallop. Pulmonary:      Effort: Pulmonary effort is normal. No respiratory distress. Breath sounds: No wheezing, rhonchi or rales. Abdominal:      General: Bowel sounds are normal. There is no distension. Palpations: Abdomen is soft. There is no mass. Tenderness: There is no abdominal tenderness. Musculoskeletal:         General: No tenderness or signs of injury. Right lower leg: No edema. Left lower leg: No edema. Comments: Trace BLE pedal edema   Skin:     General: Skin is warm and dry. Capillary Refill: Capillary refill takes less than 2 seconds. Coloration: Skin is not jaundiced. Findings: No erythema or rash. Neurological:      General: No focal deficit present. Mental Status: He is alert and oriented to person, place, and time. Mental status is at baseline.       Cranial Nerves: No cranial nerve deficit. Sensory: No sensory deficit. Motor: No weakness. Psychiatric:         Mood and Affect: Mood normal.         Behavior: Behavior normal.         Thought Content: Thought content normal.         Judgment: Judgment normal.         Investigations:      Laboratory Testing:  No results found for this or any previous visit (from the past 24 hour(s)). Imaging/Diagnostics:  No results found. Assessment :      Hospital Problems           Last Modified POA    * (Principal) Atrial fibrillation with RVR ([address] 1/20/2021 Yes    Nonischemic cardiomyopathy ([address] (Chronic) 1/20/2021 Yes    Ascending aortic aneurysm (HCC) (Chronic) 1/20/2021 Yes    MIRIAM on CPAP 1/20/2021 Yes    Essential hypertension 1/20/2021 Yes          Plan:     Patient status inpatient in the Progressive Unit/Step down    1. New onset AFib RVR: cardiology following and appreciate reccomendations, continue amiodarone gtt for now and eliquis as ordered, continuous telemetry, and EKG prn  2. Non-Ischemic Cardiomyopathy: significant decline in EF noted on recent ECHO, cardiology following as above and await further recommendations  3. MIRIAM on CPAP: continue with HS CPAP while inpatient  4. HTN: controlled at present  5. Ascending Thoracic Aneurysm: last imaging approx. 1 yr ago, stable at that time, patient denies continued follow up with vascular surgery, discussed importance of routine surveillance and resuming follow up  6. AM labs, resume selected home medications and advance to cardiac diet  7. DVT prophylaxis: on eliquis  8. Full Code    Consultations:   IP CONSULT TO CARDIOLOGY  IP CONSULT TO RESPIRATORY CARE     Patient is admitted as inpatient status because of co-morbidities listed above, severity of signs and symptoms as outlined, requirement for current medical therapies and most importantly because of direct risk to patient if care not provided in a hospital setting. Expected length of stay > 48 hours.     MALENA Wills - NP  1/20/2021  5:42 AM    Copy sent to Dr. Kingsley Loredo, DO

## 2021-01-20 NOTE — CONSULTS
[facility] Cardiology Cardiology    Consult / H&P               Today's Date: 1/20/2021  Patient Name: Kristen Shay  Date of admission: 1/20/2021  4:14 AM  Patient's age: 64 y.o., 1960  Admission Dx: Atrial fibrillation with RVR ([facility] [address] [I48.91]    Reason for Admission / Consult: A. fib with RVR    Requesting Physician: Leonore Paget, DO    CHIEF COMPLAINT: Shortness of breath, palpitations    History Obtained From:  patient, electronic medical record    HISTORY OF PRESENT ILLNESS:      The patient is a 64 y.o.  male with a history of nonischemic cardiomyopathy, MIRIAM and hyperlipidemia who presented with the chief complaints of palpitations and shortness of breath. Patient states that his symptoms started on Friday and since then he has had racing sensation of his heart almost the entire day. He states that he has had difficulty sleeping at night and has had to wake up with SOB every night within 3-4 hours of falling asleep. He endorses lightheadedness with the palpitations and shortness of breath even with minimal exertion but  denies any chest pain, syncope or lower extremity edema. In the ED, patient was found to be in A. fib with RVR with heart rate 134. CBC and BMP were unremarkable. CT chest was negative for PE but showed small bilateral pleural effusions, right greater than left. proBNP was elevated at 2351. High-sensitivity troponins were within normal limits at 14 and on repeat 13. Cardiology was consulted for A. fib with RVR. Past Medical History:   has a past medical history of Arthritis, Congestive heart failure ([facility] [address] History of alcoholism ([facility] [address] Hyperlipidemia, S/P cardiac cath, and Sleep apnea. Past Surgical History:   has a past surgical history that includes joint replacement (Bilateral). Home Medications:    Prior to Admission medications    Medication Sig Start Date End Date Taking?  Authorizing Provider   Multiple Vitamins-Minerals (THERAPEUTIC MULTIVITAMIN-MINERALS) tablet Take 1 tablet by mouth daily   Yes Historical Provider, MD   triamcinolone (KENALOG) 0.1 % lotion Apply topically 2 times daily Apply topically 3 times daily. Yes Historical Provider, MD   aspirin 81 MG tablet Take 81 mg by mouth daily   Yes Historical Provider, MD   atorvastatin (LIPITOR) 40 MG tablet Take 40 mg by mouth daily   Yes Historical Provider, MD   lisinopril (PRINIVIL;ZESTRIL) 5 MG tablet Take 1 tablet by mouth daily 12/19/17  Yes Chandan Macdonald MD   carvedilol (COREG) 3.125 MG tablet Take 1 tablet by mouth 2 times daily 12/19/17  Yes Chandan Macdonald MD        Current Facility-Administered Medications: apixaban (ELIQUIS) tablet 5 mg, 5 mg, Oral, BID  acetaminophen (TYLENOL) tablet 650 mg, 650 mg, Oral, Q6H PRN **OR** acetaminophen (TYLENOL) suppository 650 mg, 650 mg, Rectal, Q6H PRN  nicotine (NICODERM CQ) 21 MG/24HR 1 patch, 1 patch, Transdermal, Daily PRN  polyethylene glycol (GLYCOLAX) packet 17 g, 17 g, Oral, Daily PRN  promethazine (PHENERGAN) tablet 12.5 mg, 12.5 mg, Oral, Q6H PRN **OR** ondansetron (ZOFRAN) injection 4 mg, 4 mg, Intravenous, Q6H PRN  sodium chloride flush 0.9 % injection 10 mL, 10 mL, Intravenous, 2 times per day  sodium chloride flush 0.9 % injection 10 mL, 10 mL, Intravenous, PRN  atorvastatin (LIPITOR) tablet 40 mg, 40 mg, Oral, Daily  therapeutic multivitamin-minerals 1 tablet, 1 tablet, Oral, Daily  furosemide (LASIX) injection 40 mg, 40 mg, Intravenous, BID  metoprolol tartrate (LOPRESSOR) tablet 50 mg, 50 mg, Oral, BID    Allergies:  Pcn [penicillins]    Social History:   reports that he has never smoked. He has quit using smokeless tobacco.  His smokeless tobacco use included chew. He reports that he does not drink alcohol or use drugs. Family History: family history is not on file. No h/o sudden cardiac death. No for premature CAD    REVIEW OF SYSTEMS:    · Constitutional: there has been no unanticipated weight loss.  There's been No change in energy level, No change in activity level. · Eyes: No visual changes or diplopia. No scleral icterus. · ENT: No Headaches  · Cardiovascular: Remaining as above  · Respiratory: No previous pulmonary problems, No cough  · Gastrointestinal: No abdominal pain. No change in bowel or bladder habits. · Genitourinary: No dysuria, trouble voiding, or hematuria. · Musculoskeletal:  No gait disturbance, No weakness or joint complaints. · Integumentary: No rash or pruritis. · Neurological: No headache, diplopia, change in muscle strength, numbness or tingling. No change in gait, balance, coordination, mood, affect, memory, mentation, behavior. · Psychiatric: No anxiety, or depression. · Endocrine: No temperature intolerance. No excessive thirst, fluid intake, or urination. No tremor. · Hematologic/Lymphatic: No abnormal bruising or bleeding, blood clots or swollen lymph nodes. · Allergic/Immunologic: No nasal congestion or hives. PHYSICAL EXAM:      /80   Pulse 98   Temp 97.5 °F (36.4 °C) (Oral)   Resp 16   Ht 6' 3\" (1.905 m)   Wt (!) 318 lb 12.8 oz (144.6 kg)   SpO2 94%   BMI 39.85 kg/m²    No intake or output data in the 24 hours ending 01/20/21 1506      Constitutional and General Appearance: alert, cooperative, no distress and appears stated age  HEENT: PERRL, no cervical lymphadenopathy. No masses palpable. Normal oral mucosa  Respiratory:  · Normal excursion and expansion without use of accessory muscles  · Resp Auscultation: Good respiratory effort. No for increased work of breathing.  On auscultation: clear to auscultation bilaterally  Cardiovascular:  · The apical impulse is not displaced  · Heart tones are crisp and normal. regular S1 and S2.  · Jugular venous pulsation Normal  · The carotid upstroke is normal in amplitude and contour without delay or bruit  · Peripheral pulses are symmetrical and full     Abdomen:   · No masses or tenderness  · Bowel sounds present  Extremities:  ·  No Cyanosis or Clubbing  ·  Lower extremity edema: No  ·  Skin: Warm and dry  Neurological:  · Alert and oriented. · Moves all extremities well  · No abnormalities of mood, affect, memory, mentation, or behavior are noted    DATA:    Diagnostics:    EK2021: Fib with RVR, heart rate 134      ECHO: 2021  Left Ventricle : Left ventricular systolic function is severely      reduced. There is severe global hypokinesis of the left ventricle.      LVEF is 10-15%. Left ventricle is mildly dilated. There is normal      left ventricular wall thickness.      Right Ventricle : Right ventricular systolic function is moderately      reduced. The right ventricle is normal size.      Atria : Left atrium is mildly dilated. Right atrium is moderately      dilated. Coronary sinus is dilated. The lack of respiratory variation      in the inferior vena cava diameter is noted.      Aortic Valve : Trace to mild aortic regurgitation.      Mitral Valve : Mild mitral regurgitation.      Tricuspid Valve : Moderate tricuspid regurgitation. The RVSP is 45      mmHg.      Great Vessels : Aortic root is moderately dilated.      Pericardium : There is no pericardial effusion. Aortic Root       5.2 cm       Cardiac Angiography: 2017  CATH 17:    Minimal coronary artery disease.   Moderate to severe LV systolic dysfunction; LVEF 25%.   Aortic root aneurysm. TTE showed 4.8 cm. Labs:     CBC:   Recent Labs     21  1038   WBC 9.4   HGB 13.3   HCT 42.5          BMP:   Recent Labs     21  0555      K 4.0   CO2 19*   BUN 19   CREATININE 0.82   LABGLOM >60   GLUCOSE 114*     Pro-BNP:    Recent Labs     21  0555   PROBNP 2,351*     BNP: No results for input(s): BNP in the last 72 hours.   PT/INR:   Recent Labs     21  0555   PROTIME 11.6   INR 1.1     APTT:  Recent Labs     21  1038   APTT 28.0     CARDIAC ENZYMES:No results for input(s): CKTOTAL, CKMB, Aide [place] in the last 72 hours. Invalid input(s):  [address]   Recent Labs     01/20/21  0555 01/20/21  1038   TROPONINT NOT REPORTED NOT REPORTED      Recent Labs     01/20/21  0555 01/20/21  1038   TROPHS 14 13     FASTING LIPID PANEL:No results found for: HDL, LDLDIRECT, LDLCALC, TRIG  LIVER PROFILE:  Recent Labs     01/20/21  0555   AST 15   ALT 18   LABALBU 3.6         Patient's Active Problem List  Principal Problem:    Atrial fibrillation with RVR (HCC)  Active Problems:    Nonischemic cardiomyopathy (HCC)    Aortic root dilatation (HCC)    Aortic root aneurysm (HCC)    Ascending aortic aneurysm (HCC)    Obesity (BMI 30-39. 9)    HFrEF (heart failure with reduced ejection fraction) (HCC)    MIRIAM on CPAP    Essential hypertension  Resolved Problems:    * No resolved hospital problems. *        IMPRESSION:    1. Acute on chronic combined systolic and diastolic heart failure exacerbation  2. New onset A. fib with RVR  3. Nonischemic cardiomyopathy, EF 10-15%  4. Ascending aortic root dilatation (5.2 cm on echo) and 5 cm on CT scan  5. PVCs  6. Hyperlipidemia  7. Remote history of alcohol abuse  8. MIRIAM on CPAP    RECOMMENDATIONS:  1. Continue Eliquis for anticoagulation  2. No need for repeat cardiac cath at this time due to normal coronaries on cardiac cath in 2017  3. Increase metoprolol to 50 mg nid  4. Initiated IV Lasix 40 mg bid for CHF exacerbation  5. Patient seen by CT surgery, recommended intervention when aortic aneurysm is 6 cm.  6. Heart failure coordinator consult  7. Continue to hold lisinopril. Will initiate Entresto at discharge  8. If patient remains in A. fib, ANDREI cardioversion tomorrow  9. Keep K >4 and Mg >2.  10. Keep patient n.p.o. after midnight            Discussed with patient and Nurse. Cj Esquivel M.D.   Fellow, [address]        Attestation signed by      Attending Physician Statement:    I have discussed the care of

## 2021-01-21 ENCOUNTER — APPOINTMENT (OUTPATIENT)
Dept: CARDIAC CATH/INVASIVE PROCEDURES | Age: 61
DRG: 308 | End: 2021-01-21
Attending: INTERNAL MEDICINE
Payer: COMMERCIAL

## 2021-01-21 LAB
ABSOLUTE EOS #: 0.18 K/UL (ref 0–0.44)
ABSOLUTE IMMATURE GRANULOCYTE: 0.03 K/UL (ref 0–0.3)
ABSOLUTE LYMPH #: 2.51 K/UL (ref 1.1–3.7)
ABSOLUTE MONO #: 1.2 K/UL (ref 0.1–1.2)
ANION GAP SERPL CALCULATED.3IONS-SCNC: 10 MMOL/L (ref 9–17)
BASOPHILS # BLD: 1 % (ref 0–2)
BASOPHILS ABSOLUTE: 0.09 K/UL (ref 0–0.2)
BNP INTERPRETATION: ABNORMAL
BUN BLDV-MCNC: 16 MG/DL (ref 8–23)
BUN/CREAT BLD: NORMAL (ref 9–20)
CALCIUM SERPL-MCNC: 9.4 MG/DL (ref 8.6–10.4)
CHLORIDE BLD-SCNC: 101 MMOL/L (ref 98–107)
CO2: 27 MMOL/L (ref 20–31)
CREAT SERPL-MCNC: 0.9 MG/DL (ref 0.7–1.2)
DIFFERENTIAL TYPE: NORMAL
EKG ATRIAL RATE: 52 BPM
EKG Q-T INTERVAL: 352 MS
EKG QRS DURATION: 114 MS
EKG QTC CALCULATION (BAZETT): 467 MS
EKG R AXIS: -6 DEGREES
EKG T AXIS: -5 DEGREES
EKG VENTRICULAR RATE: 106 BPM
EOSINOPHILS RELATIVE PERCENT: 2 % (ref 1–4)
GFR AFRICAN AMERICAN: >60 ML/MIN
GFR NON-AFRICAN AMERICAN: >60 ML/MIN
GFR SERPL CREATININE-BSD FRML MDRD: NORMAL ML/MIN/{1.73_M2}
GFR SERPL CREATININE-BSD FRML MDRD: NORMAL ML/MIN/{1.73_M2}
GLUCOSE BLD-MCNC: 90 MG/DL (ref 70–99)
HCT VFR BLD CALC: 43.7 % (ref 40.7–50.3)
HEMOGLOBIN: 13.5 G/DL (ref 13–17)
IMMATURE GRANULOCYTES: 0 %
LV EF: 25 %
LVEF MODALITY: NORMAL
LYMPHOCYTES # BLD: 25 % (ref 24–43)
MCH RBC QN AUTO: 27.8 PG (ref 25.2–33.5)
MCHC RBC AUTO-ENTMCNC: 30.9 G/DL (ref 28.4–34.8)
MCV RBC AUTO: 90.1 FL (ref 82.6–102.9)
MONOCYTES # BLD: 12 % (ref 3–12)
NRBC AUTOMATED: 0 PER 100 WBC
PDW BLD-RTO: 12.4 % (ref 11.8–14.4)
PLATELET # BLD: 233 K/UL (ref 138–453)
PLATELET ESTIMATE: NORMAL
PMV BLD AUTO: 11.1 FL (ref 8.1–13.5)
POTASSIUM SERPL-SCNC: 4.3 MMOL/L (ref 3.7–5.3)
PRO-BNP: 1828 PG/ML
RBC # BLD: 4.85 M/UL (ref 4.21–5.77)
RBC # BLD: NORMAL 10*6/UL
SEG NEUTROPHILS: 60 % (ref 36–65)
SEGMENTED NEUTROPHILS ABSOLUTE COUNT: 6.23 K/UL (ref 1.5–8.1)
SODIUM BLD-SCNC: 138 MMOL/L (ref 135–144)
WBC # BLD: 10.2 K/UL (ref 3.5–11.3)
WBC # BLD: NORMAL 10*3/UL

## 2021-01-21 PROCEDURE — 93325 DOPPLER ECHO COLOR FLOW MAPG: CPT

## 2021-01-21 PROCEDURE — 6360000002 HC RX W HCPCS: Performed by: INTERNAL MEDICINE

## 2021-01-21 PROCEDURE — 6370000000 HC RX 637 (ALT 250 FOR IP): Performed by: NURSE PRACTITIONER

## 2021-01-21 PROCEDURE — 5A2204Z RESTORATION OF CARDIAC RHYTHM, SINGLE: ICD-10-PCS | Performed by: INTERNAL MEDICINE

## 2021-01-21 PROCEDURE — 2060000000 HC ICU INTERMEDIATE R&B

## 2021-01-21 PROCEDURE — 36415 COLL VENOUS BLD VENIPUNCTURE: CPT

## 2021-01-21 PROCEDURE — 6360000002 HC RX W HCPCS

## 2021-01-21 PROCEDURE — 93312 ECHO TRANSESOPHAGEAL: CPT

## 2021-01-21 PROCEDURE — 85025 COMPLETE CBC W/AUTO DIFF WBC: CPT

## 2021-01-21 PROCEDURE — 93010 ELECTROCARDIOGRAM REPORT: CPT | Performed by: INTERNAL MEDICINE

## 2021-01-21 PROCEDURE — 2580000003 HC RX 258: Performed by: NURSE PRACTITIONER

## 2021-01-21 PROCEDURE — B246ZZ4 ULTRASONOGRAPHY OF RIGHT AND LEFT HEART, TRANSESOPHAGEAL: ICD-10-PCS | Performed by: INTERNAL MEDICINE

## 2021-01-21 PROCEDURE — 83880 ASSAY OF NATRIURETIC PEPTIDE: CPT

## 2021-01-21 PROCEDURE — 99232 SBSQ HOSP IP/OBS MODERATE 35: CPT | Performed by: INTERNAL MEDICINE

## 2021-01-21 PROCEDURE — 92960 CARDIOVERSION ELECTRIC EXT: CPT | Performed by: INTERNAL MEDICINE

## 2021-01-21 PROCEDURE — 80048 BASIC METABOLIC PNL TOTAL CA: CPT

## 2021-01-21 RX ORDER — SACUBITRIL AND VALSARTAN 24; 26 MG/1; MG/1
1 TABLET, FILM COATED ORAL 2 TIMES DAILY
Qty: 60 TABLET | Refills: 0 | Status: SHIPPED | OUTPATIENT
Start: 2021-01-21 | End: 2021-02-25 | Stop reason: ALTCHOICE

## 2021-01-21 RX ORDER — SACUBITRIL AND VALSARTAN 24; 26 MG/1; MG/1
1 TABLET, FILM COATED ORAL 2 TIMES DAILY
Qty: 60 TABLET | Refills: 0 | Status: SHIPPED | OUTPATIENT
Start: 2021-01-21 | End: 2021-01-21 | Stop reason: SDUPTHER

## 2021-01-21 RX ADMIN — METOPROLOL TARTRATE 25 MG: 25 TABLET ORAL at 20:53

## 2021-01-21 RX ADMIN — MULTIPLE VITAMINS W/ MINERALS TAB 1 TABLET: TAB at 08:48

## 2021-01-21 RX ADMIN — METOPROLOL TARTRATE 25 MG: 25 TABLET ORAL at 08:48

## 2021-01-21 RX ADMIN — FUROSEMIDE 40 MG: 10 INJECTION, SOLUTION INTRAMUSCULAR; INTRAVENOUS at 08:48

## 2021-01-21 RX ADMIN — FUROSEMIDE 40 MG: 10 INJECTION, SOLUTION INTRAMUSCULAR; INTRAVENOUS at 17:49

## 2021-01-21 RX ADMIN — APIXABAN 5 MG: 5 TABLET, FILM COATED ORAL at 20:53

## 2021-01-21 RX ADMIN — DESMOPRESSIN ACETATE 40 MG: 0.2 TABLET ORAL at 08:48

## 2021-01-21 RX ADMIN — SODIUM CHLORIDE, PRESERVATIVE FREE 10 ML: 5 INJECTION INTRAVENOUS at 20:53

## 2021-01-21 RX ADMIN — APIXABAN 5 MG: 5 TABLET, FILM COATED ORAL at 08:48

## 2021-01-21 RX ADMIN — SODIUM CHLORIDE, PRESERVATIVE FREE 10 ML: 5 INJECTION INTRAVENOUS at 08:54

## 2021-01-21 NOTE — CARE COORDINATION
Initiated prior [place] for Pennie Dinh through [place]. [place] [id number]. Awaiting response from insurance.

## 2021-01-21 NOTE — PROGRESS NOTES
waking up in the middle of the night gasping for air feeling short of breath despite wearing CPAP. He denies any aggravating or alleviating factors, symptoms have been persistent both with activity and at rest. He denies any HA/dizziness/light headedness, ABD pain, n/v/d, cough, fevers, or BLE swelling. On arrival to outlying ER, patient noted to have new onset AFib RVR with HR: 130s. Cardiology was consulted, and patient was given 1 dose 12.5mg Coreg, Dixogin 0.5mcg, and started on amiodarone gtt. He was also started on Eliquis for anticoagulation. Presenting labs were largely unremarkable outside of D-Dimer: 1.2 and Troponins negative. CTA Chest negative for PE with small bilateral pleural effusions. 2D Echo also completed and noted to have significant decline in EF: 10-15% (Prevous ECHO with EF: 40%). Given these findings, patient is transferred to our facility for further workup and management. Review of Systems:     Constitutional:  negative for chills, fevers, sweats  HENT: reports sore throat  Respiratory: reports improvement of shortness of breath; negative for cough, dyspnea on exertion,  wheezing  Cardiovascular: reports improvement of chest pressure; negative for lower extremity edema, palpitations  Gastrointestinal:  negative for abdominal pain, constipation, diarrhea, nausea, vomiting  Neurological:  negative for dizziness, headache    Medications: Allergies:     Allergies   Allergen Reactions    Pcn [Penicillins] Anaphylaxis       Current Meds:   Scheduled Meds:    apixaban  5 mg Oral BID    sodium chloride flush  10 mL Intravenous 2 times per day    atorvastatin  40 mg Oral Daily    therapeutic multivitamin-minerals  1 tablet Oral Daily    furosemide  40 mg Intravenous BID    metoprolol tartrate  25 mg Oral BID     Continuous Infusions:   PRN Meds: acetaminophen **OR** acetaminophen, nicotine, polyethylene glycol, promethazine **OR** ondansetron, sodium chloride flush    Data: Past Medical History:   has a past medical history of Arthritis, Atrial fibrillation ([facility] [address] Congestive heart failure ([facility] H/O cardiovascular stress test, H/O echocardiogram, History of alcoholism ([facility] Hyperlipidemia, S/P cardiac cath, and Sleep apnea. Social History:   reports that he has never smoked. He has quit using smokeless tobacco.  His smokeless tobacco use included chew. He reports that he does not drink alcohol or use drugs. Family History: No family history on file. Vitals:  /80   Pulse 80   Temp 97.9 °F (36.6 °C) (Oral)   Resp 20   Ht 6' 3\" (1.905 m)   Wt (!) 318 lb 12.8 oz (144.6 kg)   SpO2 95%   BMI 39.85 kg/m²   Temp (24hrs), Av.8 °F (36.6 °C), Min:97.6 °F (36.4 °C), Max:97.9 °F (36.6 °C)    No results for input(s): POCGLU in the last 72 hours. I/O (24Hr):     Intake/Output Summary (Last 24 hours) at 2021 1537  Last data filed at 2021 8235  Gross per 24 hour   Intake 350 ml   Output 3000 ml   Net -2650 ml       Labs:  Hematology:  Recent Labs     21  0555 21  1038 21  0540   WBC  --  9.4 10.2   RBC  --  4.76 4.85   HGB  --  13.3 13.5   HCT  --  42.5 43.7   MCV  --  89.3 90.1   MCH  --  27.9 27.8   MCHC  --  31.3 30.9   RDW  --  12.4 12.4   PLT  --  226 233   MPV  --  10.8 11.1   INR 1.1  --   --      Chemistry:  Recent Labs     21  0555 21  1038 21  0540     --  138   K 4.0  --  4.3     --  101   CO2 19*  --  27   GLUCOSE 114*  --  90   BUN 19  --  16   CREATININE 0.82  --  0.90   MG 2.0  --   --    ANIONGAP 15  --  10   LABGLOM >60  --  >60   GFRAA >60  --  >60   CALCIUM 9.0  --  9.4   PROBNP 2,351*  --  1,828*   TROPHS 14 13  --      Recent Labs     21  0555   PROT 6.7   LABALBU 3.6   TSH 1.74   AST 15   ALT 18   ALKPHOS 79   BILITOT 0.42     ABG:No results found for: POCPH, PHART, PH, POCPCO2, UVH7ARP, PCO2, POCPO2, PO2ART, PO2, POCHCO3, PBT1LRF, HCO3, NBEA, PBEA, BEART, BE, THGBART, THB, WOT6GPX, Lopressor  10. Consultation to heart failure coordinator today  11. May need intermittent Lasix in outpatient setting given reduced ejection fraction  12. Possible discharge tomorrow morning. Cardiology does need to discuss the possibility of a LifeVest x42 days versus AICD placement. Long discussion with patient regarding this. He does have the month of February off prior to returning to work.       33 Cathy Patterson DO  1/21/2021  3:37 PM

## 2021-01-21 NOTE — PROCEDURES
[facility]  Transesophageal Echocardiogram and Cardioversion       Today's Date:  1/21/2021  Ordering Physician:  Dr. Sheryl Blair  Indication:   New onset Afib     Operators:  Primary:   Dr. Sanjiv Raymond  (Attending Physician)  Assistant:   Lauryn Seth MD (Cardiovascular Fellow)    Pre Procedure Conscious Sedation Data:    ASA Class:    [] I [] II [x] III [] IV    Mallampati Class:  [] I [] II [x] III [] IV    Patient seen and examined. History and Physical reviewed. Labs reviewed. After informed consent was obtained with explanation of the risks and benefits, the patient was brought to Cath lab. All sedation was administered by the cardiologist. The oropharynx was pre anesthetisized with cetacaine spray. Propofol was used for cardioversion. ANDREI:    Structures:    LA:  . Dilated No thrombus  RA:  Normal  RV:   Normal  LV:  Normal  Estimated LVEF: 25 %  Aorta:   Mild atheromatous disease arch  Pericardium: No pericardial effusion  Septum:  No intracardiac shunt via color Doppler. Valves:    Mitral Valve:  Structurally normal. Moderate regurgitation is identified. Aortic Valve: The aortic valve is trileaflet and opens adequately. No regurgitiation is identified. Tricuspid valve: Structurally normal. No regurgitation is identified. Pulmonary valve: Normal. No significant regurgitation    No valvular vegetations or thrombus identified. ANDREI Summary:     1. A ANDREI was performed without complications. 2. LVEF 25 %  3. Moderate mitral regurgitation noted. 4. Dilated Left atrium   5. No thrombus or valvular vegetation identified  6. Proceed with Cardioversion        CARDIOVERSION:    After an adequate level of sedation was achieved, 200J in biphasic synchronized delivery was administered. Cardioversion was unsuccessful. Cardioversion was repeated with 360 J and patient converted in NSR after that,   The patient awoke without complications.         The patient will continue with the discharge meds and has been instructed to follow-up with his cardiologist  for continued long term care and cardiovascular management. Electronically signed by Lolly Almaguer MD on 1/21/2021 at 2:00 PM  Cardiovascular Diseases Fellow  [address] [place] [address]    Attending Physician Statement  I have discussed the case of Antonio Baxter including pertinent history and exam findings with the resident. I have seen and examined the patient and the key elements of the encounter have been performed by me. I agree with the assessment, plan and orders as documented by the resident With changes made to the note.   I was present during entire procedure and performed all critical elements of the procedure    Electronically signed by Juliana Blum MD on 1/25/2021 at 3:02 PM.    [facility]      [phone]

## 2021-01-21 NOTE — PROGRESS NOTES
Patient returned to room post cardioversion alert and orientated. Drowsy. Daughter at bedside. Placed on telemetry and vs obtained. EKG strip posted.

## 2021-01-21 NOTE — CARE COORDINATION
Aleena 82 to inquire on cost of Entresto. States needs a PA. Attempt made x2 to initiate PA trough [employer]. [employer], states patient inactive.   PS Dr. Kevin Mcmillan to send script to [facility] pharmacy to see if they can give me a key

## 2021-01-21 NOTE — PLAN OF CARE
Problem: Falls - Risk of:  Goal: Will remain free from falls  Description: Will remain free from falls  Outcome: Met This Shift  Goal: Absence of physical injury  Description: Absence of physical injury  Outcome: Met This Shift     Problem: Cardiac:  Goal: Ability to maintain an adequate cardiac output will improve  Description: Ability to maintain an adequate cardiac output will improve  Outcome: Met This Shift  Goal: Complications related to the disease process, condition or treatment will be avoided or minimized  Description: Complications related to the disease process, condition or treatment will be avoided or minimized  Outcome: Met This Shift

## 2021-01-22 VITALS
WEIGHT: 315 LBS | RESPIRATION RATE: 18 BRPM | HEIGHT: 75 IN | BODY MASS INDEX: 39.17 KG/M2 | TEMPERATURE: 97.7 F | DIASTOLIC BLOOD PRESSURE: 84 MMHG | OXYGEN SATURATION: 96 % | HEART RATE: 103 BPM | SYSTOLIC BLOOD PRESSURE: 110 MMHG

## 2021-01-22 PROBLEM — I48.91 ATRIAL FIBRILLATION WITH RVR (HCC): Status: RESOLVED | Noted: 2021-01-19 | Resolved: 2021-01-22

## 2021-01-22 PROBLEM — I48.19 PERSISTENT ATRIAL FIBRILLATION (HCC): Status: ACTIVE | Noted: 2021-01-22

## 2021-01-22 LAB
ABSOLUTE EOS #: 0.15 K/UL (ref 0–0.44)
ABSOLUTE IMMATURE GRANULOCYTE: 0.05 K/UL (ref 0–0.3)
ABSOLUTE LYMPH #: 2.54 K/UL (ref 1.1–3.7)
ABSOLUTE MONO #: 1.34 K/UL (ref 0.1–1.2)
ANION GAP SERPL CALCULATED.3IONS-SCNC: 14 MMOL/L (ref 9–17)
BASOPHILS # BLD: 1 % (ref 0–2)
BASOPHILS ABSOLUTE: 0.09 K/UL (ref 0–0.2)
BUN BLDV-MCNC: 17 MG/DL (ref 8–23)
BUN/CREAT BLD: ABNORMAL (ref 9–20)
CALCIUM SERPL-MCNC: 9.5 MG/DL (ref 8.6–10.4)
CHLORIDE BLD-SCNC: 100 MMOL/L (ref 98–107)
CO2: 26 MMOL/L (ref 20–31)
CREAT SERPL-MCNC: 0.75 MG/DL (ref 0.7–1.2)
DIFFERENTIAL TYPE: ABNORMAL
EOSINOPHILS RELATIVE PERCENT: 1 % (ref 1–4)
GFR AFRICAN AMERICAN: >60 ML/MIN
GFR NON-AFRICAN AMERICAN: >60 ML/MIN
GFR SERPL CREATININE-BSD FRML MDRD: ABNORMAL ML/MIN/{1.73_M2}
GFR SERPL CREATININE-BSD FRML MDRD: ABNORMAL ML/MIN/{1.73_M2}
GLUCOSE BLD-MCNC: 83 MG/DL (ref 70–99)
HCT VFR BLD CALC: 44.7 % (ref 40.7–50.3)
HEMOGLOBIN: 14.3 G/DL (ref 13–17)
IMMATURE GRANULOCYTES: 0 %
LYMPHOCYTES # BLD: 22 % (ref 24–43)
MAGNESIUM: 2 MG/DL (ref 1.6–2.6)
MCH RBC QN AUTO: 27.8 PG (ref 25.2–33.5)
MCHC RBC AUTO-ENTMCNC: 32 G/DL (ref 28.4–34.8)
MCV RBC AUTO: 87 FL (ref 82.6–102.9)
MONOCYTES # BLD: 11 % (ref 3–12)
NRBC AUTOMATED: 0 PER 100 WBC
PDW BLD-RTO: 12.4 % (ref 11.8–14.4)
PLATELET # BLD: 242 K/UL (ref 138–453)
PLATELET ESTIMATE: ABNORMAL
PMV BLD AUTO: 10.8 FL (ref 8.1–13.5)
POTASSIUM SERPL-SCNC: 3.4 MMOL/L (ref 3.7–5.3)
RBC # BLD: 5.14 M/UL (ref 4.21–5.77)
RBC # BLD: ABNORMAL 10*6/UL
SEG NEUTROPHILS: 65 % (ref 36–65)
SEGMENTED NEUTROPHILS ABSOLUTE COUNT: 7.66 K/UL (ref 1.5–8.1)
SODIUM BLD-SCNC: 140 MMOL/L (ref 135–144)
WBC # BLD: 11.8 K/UL (ref 3.5–11.3)
WBC # BLD: ABNORMAL 10*3/UL

## 2021-01-22 PROCEDURE — 6370000000 HC RX 637 (ALT 250 FOR IP): Performed by: NURSE PRACTITIONER

## 2021-01-22 PROCEDURE — 94760 N-INVAS EAR/PLS OXIMETRY 1: CPT

## 2021-01-22 PROCEDURE — 6360000002 HC RX W HCPCS: Performed by: NURSE PRACTITIONER

## 2021-01-22 PROCEDURE — 99239 HOSP IP/OBS DSCHRG MGMT >30: CPT | Performed by: INTERNAL MEDICINE

## 2021-01-22 PROCEDURE — 36415 COLL VENOUS BLD VENIPUNCTURE: CPT

## 2021-01-22 PROCEDURE — 6360000002 HC RX W HCPCS: Performed by: INTERNAL MEDICINE

## 2021-01-22 PROCEDURE — 2580000003 HC RX 258: Performed by: NURSE PRACTITIONER

## 2021-01-22 PROCEDURE — 83735 ASSAY OF MAGNESIUM: CPT

## 2021-01-22 PROCEDURE — 80048 BASIC METABOLIC PNL TOTAL CA: CPT

## 2021-01-22 PROCEDURE — 85025 COMPLETE CBC W/AUTO DIFF WBC: CPT

## 2021-01-22 RX ORDER — POTASSIUM CHLORIDE 7.45 MG/ML
10 INJECTION INTRAVENOUS PRN
Status: DISCONTINUED | OUTPATIENT
Start: 2021-01-22 | End: 2021-01-22 | Stop reason: HOSPADM

## 2021-01-22 RX ORDER — DIGOXIN 125 MCG
125 TABLET ORAL DAILY
Qty: 30 TABLET | Refills: 0 | Status: SHIPPED | OUTPATIENT
Start: 2021-01-23

## 2021-01-22 RX ORDER — POTASSIUM CHLORIDE 20 MEQ/1
20 TABLET, EXTENDED RELEASE ORAL DAILY
Qty: 30 TABLET | Refills: 0 | Status: SHIPPED | OUTPATIENT
Start: 2021-01-22

## 2021-01-22 RX ORDER — FUROSEMIDE 40 MG/1
40 TABLET ORAL DAILY
Qty: 30 TABLET | Refills: 0 | Status: SHIPPED | OUTPATIENT
Start: 2021-01-22

## 2021-01-22 RX ORDER — POTASSIUM CHLORIDE 20 MEQ/1
40 TABLET, EXTENDED RELEASE ORAL PRN
Status: DISCONTINUED | OUTPATIENT
Start: 2021-01-22 | End: 2021-01-22 | Stop reason: HOSPADM

## 2021-01-22 RX ORDER — DIGOXIN 0.25 MG/ML
250 INJECTION INTRAMUSCULAR; INTRAVENOUS ONCE
Status: COMPLETED | OUTPATIENT
Start: 2021-01-22 | End: 2021-01-22

## 2021-01-22 RX ORDER — DIGOXIN 125 MCG
125 TABLET ORAL DAILY
Status: DISCONTINUED | OUTPATIENT
Start: 2021-01-23 | End: 2021-01-22 | Stop reason: HOSPADM

## 2021-01-22 RX ORDER — FUROSEMIDE 40 MG/1
40 TABLET ORAL DAILY
Status: DISCONTINUED | OUTPATIENT
Start: 2021-01-22 | End: 2021-01-22 | Stop reason: HOSPADM

## 2021-01-22 RX ADMIN — METOPROLOL TARTRATE 25 MG: 25 TABLET ORAL at 07:54

## 2021-01-22 RX ADMIN — DESMOPRESSIN ACETATE 40 MG: 0.2 TABLET ORAL at 07:55

## 2021-01-22 RX ADMIN — POTASSIUM CHLORIDE 40 MEQ: 1500 TABLET, EXTENDED RELEASE ORAL at 06:48

## 2021-01-22 RX ADMIN — ACETAMINOPHEN 650 MG: 325 TABLET ORAL at 01:45

## 2021-01-22 RX ADMIN — FUROSEMIDE 40 MG: 10 INJECTION, SOLUTION INTRAMUSCULAR; INTRAVENOUS at 07:55

## 2021-01-22 RX ADMIN — MULTIPLE VITAMINS W/ MINERALS TAB 1 TABLET: TAB at 07:55

## 2021-01-22 RX ADMIN — DIGOXIN 250 MCG: 0.25 INJECTION INTRAMUSCULAR; INTRAVENOUS at 12:18

## 2021-01-22 RX ADMIN — APIXABAN 5 MG: 5 TABLET, FILM COATED ORAL at 07:54

## 2021-01-22 RX ADMIN — SODIUM CHLORIDE, PRESERVATIVE FREE 10 ML: 5 INJECTION INTRAVENOUS at 07:58

## 2021-01-22 ASSESSMENT — PAIN SCALES - GENERAL
PAINLEVEL_OUTOF10: 0
PAINLEVEL_OUTOF10: 3

## 2021-01-22 NOTE — PLAN OF CARE
Problem: Falls - Risk of:  Goal: Will remain free from falls  Description: Will remain free from falls  1/22/2021 1511 by Franck Fung RN  Outcome: Completed  1/22/2021 0956 by Franck Fung RN  Outcome: Ongoing  1/22/2021 0641 by Maggie Griffin RN  Outcome: Met This Shift  Goal: Absence of physical injury  Description: Absence of physical injury  1/22/2021 1511 by Franck Fung RN  Outcome: Completed  1/22/2021 0956 by Franck Fung RN  Outcome: Ongoing  1/22/2021 0641 by Maggie Griffin RN  Outcome: Met This Shift     Problem: Cardiac:  Goal: Ability to maintain an adequate cardiac output will improve  Description: Ability to maintain an adequate cardiac output will improve  1/22/2021 1511 by Franck Fung RN  Outcome: Completed  1/22/2021 0956 by Franck Fung RN  Outcome: Ongoing  1/22/2021 0641 by Maggie Griffin RN  Outcome: Met This Shift  Goal: Complications related to the disease process, condition or treatment will be avoided or minimized  Description: Complications related to the disease process, condition or treatment will be avoided or minimized  1/22/2021 1511 by Franck Fung RN  Outcome: Completed  1/22/2021 0956 by Franck Fung RN  Outcome: Ongoing  1/22/2021 0641 by Maggie Griffin RN  Outcome: Met This Shift

## 2021-01-22 NOTE — PROGRESS NOTES
Pt discharged home with wife. All belongings accounted for. Discharge instructions reviewed. All questions answered to satisfaction.

## 2021-01-22 NOTE — CARE COORDINATION
Checked PA for Posada-Diaz through [employer]. PA was approved. Aleena 82 to verify, and they stated that it would be an $80 copay. [place] pharmacy also filled his eliquis and the copay for that will be $40. I asked [facility]'s pharmacy to run the free trial offers for both Entresto and Eliquis, however, they stated that the free trial offers were already filled through [facility] 's outpatient pharmacy. [facility] outpatient pharmacy to confirm that we filled the free 30 day trials for entresto and eliquis. They confirmed that they do have both 30 day supplies filled through our pharmacy with the free trial.    1030- spoke with pt at bedside and gave him the vouchers for the free 30 day trials for both eliquis and entresto, as well as explained to him what the copays were for both at [address] He verbalized understanding and thanked me for all of the help. 46- spoke with Corie Stanley NP with cardiology regarding if pt is still going to be getting a lifevest or not. She stated that she was going to review and let me know. [address] with [facility] Up and she stated that she faxed over all of the info to [facility] and spoke with Precious Jefferson at [phone] to verify that they received all of the info. He stated that he would look into it and call me back. [address] with Bennie Vo to confirm that they received the paperwork to get the pt fitted for lifevest.  and requested a call back. [address] with Yesenia Gary with [facility] and she stated that she is waiting on confirmation on the pt's old lifevest and will know if it is approved here shortly. She stated that she could not give me a time that they would be available to come to fit the patient this evening. 1500- Pt called out stating that he did not want to wait for a lifevest, and is only waiting until Micki Jose Roberto, RN notified.  I PS Connie Armijo NP with cardio to update her on pt not wanting to wait for the lifevest. 1- Received a PS back from MADISON Bravo with cardio stating that the pt can be fitted for Lifevest as OP and to let Emilie Euceda know to followup with him to fit him at home for Hugo Kruger RN know to update the pt.    1510- Updated pt on above, and called Suraj Hernandez with [facility] to update her on plan.

## 2021-01-22 NOTE — PROGRESS NOTES
[facility]  Office: [address], DO, Yoselin Vogel, DO, Cici Moncada, DO, Apoorva Momin Blood, DO, Apurva Elizondo MD, Aleks Meraz MD, Edilson London MD, Norm Weston MD, Francine Burdick MD, Alyssia Ruiz MD, Cat Stanley MD, Lola Tejeda MD, Elvia Sullivan MD, Trinity Hu DO, Finesse Johnson MD, Dre Raines MD, Oral Brisk, DO, Melody Aranda MD,  Jas Yeager DO, Evan Abdi MD, Trisha Sauceda MD, Alex Taylor, ANDRES, [facility] Black, CNP, Mago Cano, CNP, Arias Atwood, CNS, Jun Chamberlain, CNP, Minna Christie, CNP, Noemí Romero, CNP, Jo-Ann Saenz, CNP, Nola Fuentes, CNP, Lance Rodriguez PA-C, Juan M Bryan, [facility], Krys Stanley, CNP, Jennie Coley, CNP, Teresa Williamson, CNP, Erasmo Orellana, CNP, Shasta Chandler, [facility]   [address] [facility]    Progress Note    1/22/2021    10:16 AM    Name:   Silverio Hamm  MRN:     [id number]     Acct:      [de-identified]   Room:   [facility]/1011-01   Day:  2  Admit Date:  1/20/2021  4:14 AM    PCP:   Karli Acevedo DO  Code Status:  Full Code    Subjective:     C/C: chest discomfort, shortness of breath    Interval History Status: significantly improved. Pt seen and examined this afternoon. Patient underwent ANDREI with cardioversion yesterday. However, remains in a-fib. Despite this, the patient is feeling well. In fact, he states that he feels \"great! \"  He denies any shortness of breath, chest heaviness, or feeling of panic or doom. Brief History: This is a 64 yr old male with history of cardiomyopathy, MIRIAM on CPAP, and known ascending thoracic aortic aneurysm (last measured 4.2cmx 4.1cm 1yr ago), HTN, and HLD who presents initially to [facility]. ER with complaints of persistent mid sternal/ non-radiating chest pressure and sudden onset dyspnea that started 4 days ago.   Patient began noticing symptoms upon waking up in the middle of the night gasping for air feeling short of breath despite wearing CPAP. He denies any aggravating or alleviating factors, symptoms have been persistent both with activity and at rest. He denies any HA/dizziness/light headedness, ABD pain, n/v/d, cough, fevers, or BLE swelling. On arrival to outlying ER, patient noted to have new onset AFib RVR with HR: 130s. Cardiology was consulted, and patient was given 1 dose 12.5mg Coreg, Dixogin 0.5mcg, and started on amiodarone gtt. He was also started on Eliquis for anticoagulation. Presenting labs were largely unremarkable outside of D-Dimer: 1.2 and Troponins negative. CTA Chest negative for PE with small bilateral pleural effusions. 2D Echo also completed and noted to have significant decline in EF: 10-15% (Prevous ECHO with EF: 40%). Given these findings, patient is transferred to our facility for further workup and management. Review of Systems:     Constitutional:  negative for chills, fevers, sweats  Respiratory: reports improvement of shortness of breath; negative for cough, dyspnea on exertion,  wheezing  Cardiovascular: reports improvement of chest pressure; negative for lower extremity edema, palpitations  Gastrointestinal:  negative for abdominal pain, constipation, diarrhea, nausea, vomiting  Neurological:  negative for dizziness, headache    Medications: Allergies:     Allergies   Allergen Reactions    Pcn [Penicillins] Anaphylaxis       Current Meds:   Scheduled Meds:    apixaban  5 mg Oral BID    sodium chloride flush  10 mL Intravenous 2 times per day    atorvastatin  40 mg Oral Daily    therapeutic multivitamin-minerals  1 tablet Oral Daily    furosemide  40 mg Intravenous BID    metoprolol tartrate  25 mg Oral BID     Continuous Infusions:   PRN Meds: potassium chloride **OR** potassium alternative oral replacement **OR** potassium chloride, benzocaine-menthol, acetaminophen **OR** acetaminophen, nicotine, polyethylene glycol, promethazine **OR** ondansetron, sodium chloride flush    Data:     Past Medical History:   has a past medical history of Arthritis, Atrial fibrillation ([facility] [address] Congestive heart failure ([facility] H/O cardiovascular stress test, H/O echocardiogram, History of alcoholism ([facility] Hyperlipidemia, S/P cardiac cath, and Sleep apnea. Social History:   reports that he has never smoked. He has quit using smokeless tobacco.  His smokeless tobacco use included chew. He reports that he does not drink alcohol or use drugs. Family History: No family history on file. Vitals:  /64   Pulse 96   Temp 97.9 °F (36.6 °C) (Oral)   Resp 18   Ht 6' 3\" (1.905 m)   Wt (!) 318 lb 12.8 oz (144.6 kg)   SpO2 96%   BMI 39.85 kg/m²   Temp (24hrs), Av.8 °F (36.6 °C), Min:97.6 °F (36.4 °C), Max:98 °F (36.7 °C)    No results for input(s): POCGLU in the last 72 hours. I/O (24Hr):     Intake/Output Summary (Last 24 hours) at 2021 1016  Last data filed at 2021 0935  Gross per 24 hour   Intake 250 ml   Output 3180 ml   Net -2930 ml       Labs:  Hematology:  Recent Labs     21  1038 2140 21   WBC  --  9.4 10.2 11.8*   RBC  --  4.76 4.85 5.14   HGB  --  13.3 13.5 14.3   HCT  --  42.5 43.7 44.7   MCV  --  89.3 90.1 87.0   MCH  --  27.9 27.8 27.8   MCHC  --  31.3 30.9 32.0   RDW  --  12.4 12.4 12.4   PLT  --  226 233 242   MPV  --  10.8 11.1 10.8   INR 1.1  --   --   --      Chemistry:  Recent Labs     21  0555 21  1038 21  0540 01/22/21  0327     --  138 140   K 4.0  --  4.3 3.4*     --  101 100   CO2 19*  --  27 26   GLUCOSE 114*  --  90 83   BUN 19  --  16 17   CREATININE 0.82  --  0.90 0.75   MG 2.0  --   --  2.0   ANIONGAP 15  --  10 14   LABGLOM >60  --  >60 >60   GFRAA >60  --  >60 >60   CALCIUM 9.0  --  9.4 9.5   PROBNP 2,351*  --  1,828*  --    TROPHS 14 13  --   --      Recent Labs     21  0555   PROT 6.7   LABALBU 3.6   TSH 1.74   AST 15   ALT 18   ALKPHOS 79   BILITOT 0. 43     ABG:No results found for: POCPH, PHART, PH, POCPCO2, BPZ4DBZ, PCO2, POCPO2, PO2ART, PO2, POCHCO3, VKC6GWJ, HCO3, NBEA, PBEA, BEART, BE, THGBART, THB, DJJ3ZZD, UTSB8GXM, L7EWRJUN, O2SAT, FIO2  No results found for: SPECIAL  No results found for: CULTURE    Radiology:  Xr Chest Portable    Result Date: 1/20/2021  Mild pulmonary vascular congestion on a background of cardiomegaly. Physical Examination:        General appearance:  alert, cooperative and no distress, obese  gentleman lying supine in bed  Mental Status:  oriented to person, place and time and normal affect  Lungs:  clear to auscultation bilaterally, normal effort  Heart:  Controlled rate (98 bpm) irregular rhythm, no murmur  Abdomen:  soft, nontender, nondistended, normal bowel sounds, no masses, hepatomegaly, splenomegaly  Extremities:  no edema, redness, tenderness in the calves  Skin:  no gross lesions, rashes, induration, well-healed cicatrix on bilateral knees    Assessment:        Hospital Problems           Last Modified POA    * (Principal) Atrial fibrillation with RVR ([address] 1/20/2021 Yes    Nonischemic cardiomyopathy ([address] (Chronic) 1/20/2021 Yes    Aortic root dilatation (HCC) (Chronic) 1/20/2021 Yes    Aortic root aneurysm (HCC) (Chronic) 1/20/2021 Yes    Ascending aortic aneurysm (HCC) (Chronic) 1/20/2021 Yes    MIRIAM on CPAP 1/20/2021 Yes    Essential hypertension 1/20/2021 Yes    Obesity (BMI 30-39.9) 1/20/2021 Yes    HFrEF (heart failure with reduced ejection fraction) ([address] 1/20/2021 Yes          Plan:        1. Appreciate the assistance of my consultants on the case  2. Per CT surgery, repeat CT imaging in 1 year, as there is no aortic insufficiency noted. Follow up put in chart. 3. Per cardiology, medical management. ANDREI performed with unsuccessful cardioversion. Rate control. Life vest at discharge. Following up with Dr. Agnieszka Boswell in 1 week. 4. Start entresto tomorrow  5.  Labs can be obtained at that time to evaluate for renal function and potassium levels  6. Lasix 20 mg daily x 1 week. Further lasix dosing to be adjusted by cardiology next week  7. Continue Eliquis, Lipitor, Lasix, Lopressor  8. Appreciate assistance of heart failure coordinator   9.  OK for discharge once life vest is arranged for    [address] Arenkelly Patterson DO  1/22/2021  10:16 AM

## 2021-01-22 NOTE — PROGRESS NOTES
[facility]  Progress Note                   Date:   1/22/2021  Patient name: Scottie Sullivan  Date of admission:  1/20/2021  4:14 AM  MRN:   [id number]  YOB: 1960  PCP: Girish Proctor DO    Reason for Admission: Atrial fibrillation with RVR ([facility] [address] [I48.91]    Subjective:       Clinical Changes /Abnormalities: pt. Seen & examined alone room. Review of Systems    Medications:   Scheduled Meds:   apixaban  5 mg Oral BID    sodium chloride flush  10 mL Intravenous 2 times per day    atorvastatin  40 mg Oral Daily    therapeutic multivitamin-minerals  1 tablet Oral Daily    furosemide  40 mg Intravenous BID    metoprolol tartrate  25 mg Oral BID     Continuous Infusions:  CBC:   Recent Labs     01/20/21  1038 01/21/21  0540 01/22/21  0327   WBC 9.4 10.2 11.8*   HGB 13.3 13.5 14.3    233 242     BMP:    Recent Labs     01/20/21  0555 01/21/21  0540 01/22/21  0327    138 140   K 4.0 4.3 3.4*    101 100   CO2 19* 27 26   BUN 19 16 17   CREATININE 0.82 0.90 0.75   GLUCOSE 114* 90 83     Hepatic:  Recent Labs     01/20/21  0555   AST 15   ALT 18   BILITOT 0.42   ALKPHOS 79     Troponin:   Recent Labs     01/20/21  0555 01/20/21  1038   TROPHS 14 13     BNP: No results for input(s): BNP in the last 72 hours. Lipids: No results for input(s): CHOL, HDL in the last 72 hours. Invalid input(s): LDLCALCU  INR:   Recent Labs     01/20/21  0555   INR 1.1       Objective:   Vitals: /64   Pulse 96   Temp 97.9 °F (36.6 °C) (Oral)   Resp 18   Ht 6' 3\" (1.905 m)   Wt (!) 318 lb 12.8 oz (144.6 kg)   SpO2 96%   BMI 39.85 kg/m²   General appearance: alert and cooperative with exam  HEENT: Head: Normocephalic, no lesions, without obvious abnormality. Neck:no JVD, trachea midline, no adenopathy  Lungs: Clear to auscultation, dfminished. On RA without distress  Heart: Irregular rate and rhythm, s1/s2 auscultated, no murmurs.  Afib   Abdomen: soft, non-tender, bowel sounds active  Extremities: no edema  Neurologic: not done    ANDREI:     Structures:     LA:       . Dilated No thrombus  RA:      Normal  RV:      Normal  LV:       Normal  Estimated LVEF: 25 %  Aorta:               Mild atheromatous disease arch  Pericardium:    No pericardial effusion  Septum:           No intracardiac shunt via color Doppler.        Valves:     Mitral Valve:    Structurally normal. Moderate regurgitation is identified. Aortic Valve: The aortic valve is trileaflet and opens adequately. No regurgitiation is identified. Tricuspid valve: Structurally normal. No regurgitation is identified. Pulmonary valve: Normal. No significant regurgitation     No valvular vegetations or thrombus identified.        ANDREI Summary:      1. A ANDREI was performed without complications. 2. LVEF 25 %  3. Moderate mitral regurgitation noted. 4. Dilated Left atrium   5. No thrombus or valvular vegetation identified  6. Proceed with Cardioversion           CARDIOVERSION:     After an adequate level of sedation was achieved, 200J in biphasic synchronized delivery was administered. Cardioversion was unsuccessful. Cardioversion was repeated with 360 J and patient converted in NSR after that,   The patient awoke without complications. ECHO 1/19/2021: EF 10-15%, trace-mild AR, mild MR, moderate TR, RVSP is 45 mmHg.      CATH 12/19/17:    Minimal coronary artery disease.   Moderate to severe LV systolic dysfunction; LVEF 25%.   Aortic root aneurysm. TTE showed 4.8 cm.     Previous office/hospital visit:   Dr. Rafat Desai 1/9/2020:   1. TTE (10/20/17): LVEF 35-40%. Severely dilated LV. Mild to mod concentric LVH. Mid anterior hypokinesis, Inferior base and mid segments hypokinetic. Mild to mod MR. Trace to mild AI. Aortic root is mod dilated, 4.8cm. Prox, ascending thoracic aorta mildly dilated, approx 4.3cm. Aortic arch mildly dilated, 3.9cm.  2. Lexiscan (11/29/17): Negative ECG portion.  Small fixed inferior defect with no suggestion of ischemia. Markedly increased LVEDP with diffuse moderately severe to severe LT ventricular hypokinesis. LVEF 33%. 3. Dyslipidemia with Lipid panel (8/11/16): Total 152, HDL 30, LDL 93, Tri 144  4. Holter monitoring (12/18/2017): Sinus rhythm with sinus tachycardia. Maximum heart rate of 140 bpm. Rare PACs with pairs. Nonsustained paroxysmal atrial tachycardia with 5-beat run. Rare PVC and couplet. One ventricular triplet. 5. Cardiac catheterization (12/19/2017): Minimal coronary artery disease. Moderate to severe LV systolic dysfunction. LV ejection fraction 25%. Aortic root aneurysm noted. 6. CT angiogram chest (12/29/2017): 4.2 cm ascending aortic aneurysm. 7. TTE (8/8/18): LVEF 40%, global hypokinesia, mild MR, mild TR, mild AI, dilated aortic root 5.1 cm    Assessment / Acute Cardiac Problems:   1. Acute on chronic systolic & diastolic CHF  2. New onset Afib with RVR  3. H/O NICMP with LVEF 10-15%  4. Non obstructive CAD on cath 2017  5. Dilated Aorta 5cm  6. PVCs  7. HLP  8. Remote ETOH use    Patient Active Problem List:     Nonischemic cardiomyopathy (HCC)     Abnormal nuclear stress test     PVC (premature ventricular contraction)     Aortic root dilatation (HCC)     Aortic root aneurysm (HCC)     Ascending aortic aneurysm (HCC)     Atrial fibrillation with RVR (HCC)     MIRIAM on CPAP     Essential hypertension     Obesity (BMI 30-39. 9)     HFrEF (heart failure with reduced ejection fraction) ([facility]      Plan of Treatment:   1. Post unsuccessful  carddioversion yesterday. Rate 80-100s - asymptomatic. Stable and wants to go home. Discussed with Dr. Sheryl Blair. Will continue PO Metoprolol and start on PO Dig. Continue PO Eliquis  2. Clinically no volume overload. Will switch Lasix to PO. Continue ASA, statin. 3. 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  Further consideration for ischemic work-up as OP  4. F/U with CTS recommendations as discussed in their note  5. Once fitted for lifevest will be OK for discharge from CV standpoint with OP f/u in 2 weeks.      Electronically signed by MALENA Santamaria CNP on 1/22/2021 at 10:49 AM  [address]  [phone]

## 2021-01-22 NOTE — PLAN OF CARE
Problem: Falls - Risk of:  Goal: Will remain free from falls  Description: Will remain free from falls  1/22/2021 0956 by Kati Cunningham RN  Outcome: Ongoing  1/22/2021 0641 by Dana Machuca RN  Outcome: Met This Shift  Goal: Absence of physical injury  Description: Absence of physical injury  1/22/2021 0956 by Kati Cunningham RN  Outcome: Ongoing  1/22/2021 0641 by Dana Machuca RN  Outcome: Met This Shift     Problem: Cardiac:  Goal: Ability to maintain an adequate cardiac output will improve  Description: Ability to maintain an adequate cardiac output will improve  1/22/2021 0956 by Kati Cunningham RN  Outcome: Ongoing  1/22/2021 0641 by Dana Machuca RN  Outcome: Met This Shift  Goal: Complications related to the disease process, condition or treatment will be avoided or minimized  Description: Complications related to the disease process, condition or treatment will be avoided or minimized  1/22/2021 0956 by Kati Cunningham RN  Outcome: Ongoing  1/22/2021 0641 by Dana Machuca RN  Outcome: Met This Shift

## 2021-01-22 NOTE — DISCHARGE SUMMARY
[facility]  Office: [address], DO, Hany Dean, DO, Belkis Reddy, DO, Jonas Roberts Blood, DO, Cammie Macdonald MD, Kaylan Mckeon MD, Maryanne Flores MD, Krish Chaudhari MD, Janette Bill MD, Marga Martinez MD, Rip Yadav MD, Ynes Allen MD, Elvia Ayala MD, Freida Blue, DO, Pravin Garcia MD, Fede Joe MD, Amrita Park, DO, Rishabh Ho MD,  Robert Castro, DO, Petr Larkin MD, Rich Zuniga MD, Bobby Roman, [facility], [facility], CNP, Mario Sanchez, CNP, Margo Watts, CNS, Franklin Noble, CNP, Victor Manuel Guerra, CNP, Gerard Ponce, CNP, Laxmi Triplett, CNP, Boom Duncan, CNP, Alethea Desai PA-C, Hillary Rodriguez, [facility], Rebecca Self, CNP, Coco Serrato, CNP, Darek Morocho, CNP, Erin Slaughter, CNP, Galo Rossi, [facility]    Discharge Summary     Patient ID: Taj Norman  :  1960   MRN: [id number]     ACCOUNT:  [de-identified]   Patient's PCP: Kingsley Loredo DO  Admit Date: 2021   Discharge Date: 2021  Length of Stay: 2  Code Status:  Full Code  Admitting Physician: Pratik Mckinley DO  Discharge Physician: Ashu Brown DO     Active Discharge Diagnoses:     Hospital Problem Lists:  Principal Problem (Resolved):    Atrial fibrillation with RVR [facility])  Active Problems:    Nonischemic cardiomyopathy ([address]    Aortic root dilatation (HCC)    Aortic root aneurysm (HCC)    Ascending aortic aneurysm (HCC)    Persistent atrial fibrillation (HCC)    MIRIAM on CPAP    Essential hypertension    Obesity (BMI 30-39. 9)    HFrEF (heart failure with reduced ejection fraction) [facility])      Admission Condition:  fair     Discharged Condition: good    Hospital Stay:     Hospital Course:  Taj Norman is a very pleasant 24-year-old gentleman who presented to [facility] emergency department for evaluation of chest pressure and a feeling of panic that started on the day prior to arrival.  Associated symptoms include shortness of breath at night. His comorbidities include a history of nonischemic cardiomyopathy, known a sending thoracic aortic aneurysm, obstructive sleep apnea on CPAP, hypertension, and hyperlipidemia. He was admitted to their facility for further evaluation. Patient was found to be in new onset atrial fibrillation with RVR. Rate controlling medications were adjusted patient was actually started on amiodarone infusion. Anticoagulation was started. 2D echo at the outside facility revealed an EF of 10 to 15%. He has a history of known cardiomyopathy with an EF of 25% in 2017. He underwent cardiac catheterization at that time which revealed normal coronary arteries. At that time, he was started on lisinopril and carvedilol with improvement of his EF. His last known EF was 40%. He was subsequently transferred to [facility] for further evaluation and treatment of nonischemic cardiomyopathy with worsening heart failure with reduced ejection fraction. He was evaluated by our cardiology and cardiothoracic surgery teams. ANDREI revealed no aortic insufficiency, but did reveal moderate mitral regurgitation. During ANDREI, patient underwent cardioversion x2. This was unsuccessful. Rate controlling medications have been adjusted. He will be discharged home on the below noted medications. He will follow-up with CT surgery in 1 year for reevaluation of ascending thoracic aortic aneurysm. He has a scheduled appointment with his cardiologist next Friday. We have started him on Entresto and Eliquis. Unfortunate, LifeVest was unavailable for pickup prior to discharge. Cardiology has approved patient to be fitted in the outpatient setting. Patient is hesitant about wearing a LifeVest, as he has previously worn a LifeVest which caused him significant anxiety and discomfort. He will be fitted in the outpatient setting.   Patient understands agrees to the above treatment plan set forth. Significant therapeutic interventions:   -Cardiology and cardiothoracic surgery evaluations  -Adjustment of rate control medications  -Initiation of anticoagulation  -ANDREI with cardioversion x2 (unsuccessful)    Significant Diagnostic Studies:   Labs / Micro:  CBC:   Lab Results   Component Value Date    WBC 11.8 01/22/2021    RBC 5.14 01/22/2021    HGB 14.3 01/22/2021    HCT 44.7 01/22/2021    MCV 87.0 01/22/2021    MCH 27.8 01/22/2021    MCHC 32.0 01/22/2021    RDW 12.4 01/22/2021     01/22/2021     CMP:    Lab Results   Component Value Date    GLUCOSE 83 01/22/2021     01/22/2021    K 3.4 01/22/2021     01/22/2021    CO2 26 01/22/2021    BUN 17 01/22/2021    CREATININE 0.75 01/22/2021    ANIONGAP 14 01/22/2021    ALKPHOS 79 01/20/2021    ALT 18 01/20/2021    AST 15 01/20/2021    BILITOT 0.42 01/20/2021    LABALBU 3.6 01/20/2021    ALBUMIN 1.2 01/20/2021    LABGLOM >60 01/22/2021    GFRAA >60 01/22/2021    GFR      01/22/2021    GFR NOT REPORTED 01/22/2021    PROT 6.7 01/20/2021    CALCIUM 9.5 01/22/2021        Radiology:  Xr Chest Portable    Result Date: 1/20/2021  Mild pulmonary vascular congestion on a background of cardiomegaly. Consultations:    Consults:     Final Specialist Recommendations/Findings:   IP CONSULT TO CARDIOLOGY  IP CONSULT TO CARDIOTHORACIC SURGERY  IP CONSULT TO HEART FAILURE NURSE/COORDINATOR      The patient was seen and examined on day of discharge and this discharge summary is in conjunction with any daily progress note from day of discharge. Discharge plan:     Disposition: Home    Physician Follow Up:     Lolita Irving MD  [address] [place] [address]  [phone]    Go on 2/25/2021  10:00-Follow up regarding CT findings and heart failure management    Renetta Savage MD  [facility] Leander Arias. [place].  [place]omas[place] Garcia [place]  [phone]    Go on 1/28/2021  1:00pm for [facility] [place] f/u    Akhil WOODRUFF DO Garo  [address] [facility] [address]  [phone]    Schedule an appointment as soon as possible for a visit in 1 week  For follow up after hospitalization       Requiring Further Evaluation/Follow Up POST HOSPITALIZATION/Incidental Findings: Needs the following followed up on:  - Repeat CT imaging of the chest in 1 year  - Repeat 2D echo in approximately 40 days  - Follow up with cardiology next week for LifeVest fitting  - Labs next week    Diet: cardiac diet    Activity: As tolerated    Instructions to Patient: None    Discharge Medications:      Medication List      START taking these medications    apixaban 5 MG Tabs tablet  Commonly known as: ELIQUIS  Take 1 tablet by mouth 2 times daily     digoxin 125 MCG tablet  Commonly known as: LANOXIN  Take 1 tablet by mouth daily  Start taking on: January 23, 2021     Entresto 24-26 MG per tablet  Generic drug: sacubitril-valsartan  Take 1 tablet by mouth 2 times daily     furosemide 40 MG tablet  Commonly known as: LASIX  Take 1 tablet by mouth daily     metoprolol tartrate 25 MG tablet  Commonly known as: LOPRESSOR  Take 1 tablet by mouth 2 times daily     potassium chloride 20 MEQ extended release tablet  Commonly known as: KLOR-CON M  Take 1 tablet by mouth daily        CONTINUE taking these medications    aspirin 81 MG tablet     atorvastatin 40 MG tablet  Commonly known as: LIPITOR     therapeutic multivitamin-minerals tablet     triamcinolone 0.1 % lotion  Commonly known as: KENALOG        STOP taking these medications    carvedilol 3.125 MG tablet  Commonly known as: Coreg     lisinopril 5 MG tablet  Commonly known as: PRINIVIL;ZESTRIL           Where to Get Your Medications      These medications were sent to [address], [address] [address]    Phone: [phone]   · apixaban 5 MG Tabs tablet  · digoxin 125 MCG tablet  · Entresto 24-26 MG per tablet  · furosemide 40 MG tablet  · metoprolol tartrate 25 MG tablet  · potassium chloride 20 MEQ extended release tablet         Discharge Procedure Orders   Basic Metabolic Panel   Standing Status: Future Standing Exp. Date: 01/22/22       Time Spent on discharge is  50 mins in patient examination, evaluation, counseling as well as medication reconciliation, prescriptions for required medications, discharge plan and follow up. Electronically signed by   Selene Calvillo DO  1/22/2021  11:50 AM      Thank you Dr. Kameron Renteria DO for the opportunity to be involved in this patient's care.

## 2021-01-22 NOTE — DISCHARGE INSTR - DIET
Good nutrition is important when healing from an illness, injury, or surgery. Follow any nutrition recommendations given to you during your hospital stay. If you were given an oral nutrition supplement while in the hospital, continue to take this supplement at home. You can take it with meals, in-between meals, and/or before bedtime. These supplements can be purchased at most local grocery stores, pharmacies, and chain [employer]-stores.    If you have any questions about your diet or nutrition, call the hospital and ask for the dietitian.    - Heart healthy, low sodium diet

## 2021-02-25 ENCOUNTER — OFFICE VISIT (OUTPATIENT)
Dept: CARDIOTHORACIC SURGERY | Age: 61
End: 2021-02-25

## 2021-02-25 VITALS
WEIGHT: 307 LBS | BODY MASS INDEX: 38.17 KG/M2 | OXYGEN SATURATION: 95 % | DIASTOLIC BLOOD PRESSURE: 80 MMHG | HEART RATE: 64 BPM | HEIGHT: 75 IN | TEMPERATURE: 97.8 F | RESPIRATION RATE: 16 BRPM | SYSTOLIC BLOOD PRESSURE: 117 MMHG

## 2021-02-25 DIAGNOSIS — I71.21 ASCENDING AORTIC ANEURYSM: Primary | ICD-10-CM

## 2021-02-25 PROCEDURE — 99024 POSTOP FOLLOW-UP VISIT: CPT | Performed by: NURSE PRACTITIONER

## 2021-02-25 NOTE — PROGRESS NOTES
[id number] [facility]  Office Visit      Subjective:  Mr. Callie Salamanca is a 64 y.o. male   He is a follow-up patient regarding a sending aneurysm noted at 4.3. At the beginning of last month patient was hospitalized due to onset of shortness of breath. Prior to this event patient was not taking any medication. Patient noted to be in A. fib RVR and cardiology was consulted. Patient has a clean cardiac cath. Patient scheduled for ablation in March and will be reevaluated by cardiology to see if his ejection fraction increases    Today he presents in sinus rhythm by auscultation. No chest pain or shortness of breath. He has lost weight. He states his breathing has drastically improved. He is able to do activities at home. He follows up with Dr. Wakefield from [facility]  Physical Exam  Vitals:  Vitals:    02/25/21 1000   BP: 117/80   Pulse: 64   Resp: 16   Temp: 97.8 °F (36.6 °C)   SpO2: 95%       General: Alert and Oriented x3. Ambulatory. No apparent distress. Chest:  No abnormality. Equal and symmetric expansion with respiration. Lungs:  Clear to auscultation. Cardiac:  Regular rate and rhythm without murmurs, rubs or gallops. Abdomen:  Soft, non-tender, normoactive bowel sounds. Extremities:  No edema. Intact pulses in all four extremities. Psychiatric: Mood and affect are appropriate. Lower extremity pitting edema has drastically improved.   Now just generalized edema with no pitting    Current Medications:    Current Outpatient Medications:     digoxin (LANOXIN) 125 MCG tablet, Take 1 tablet by mouth daily, Disp: 30 tablet, Rfl: 0    furosemide (LASIX) 40 MG tablet, Take 1 tablet by mouth daily, Disp: 30 tablet, Rfl: 0    potassium chloride (KLOR-CON M) 20 MEQ extended release tablet, Take 1 tablet by mouth daily, Disp: 30 tablet, Rfl: 0    apixaban (ELIQUIS) 5 MG TABS tablet, Take 1 tablet by mouth 2 times daily, Disp: 60 tablet, Rfl: 0    metoprolol tartrate (LOPRESSOR) 25 MG tablet, Take 1 tablet by mouth 2 times daily, Disp: 60 tablet, Rfl: 0    Multiple Vitamins-Minerals (THERAPEUTIC MULTIVITAMIN-MINERALS) tablet, Take 1 tablet by mouth daily, Disp: , Rfl:     aspirin 81 MG tablet, Take 81 mg by mouth daily, Disp: , Rfl:     atorvastatin (LIPITOR) 40 MG tablet, Take 40 mg by mouth daily, Disp: , Rfl:     Past Surgical History:   Procedure Laterality Date    CARDIAC CATHETERIZATION  12/19/2017    Minimal coronary artery disease.  CARDIOVERSION  01/21/2021    JOINT REPLACEMENT Bilateral     knee    TRANSESOPHAGEAL ECHOCARDIOGRAM  01/21/2021    TRANSESOPHAGEAL ECHOCARDIOGRAM  08/08/2018    TTE (8/8/18): LVEF 40%, global hypokinesia, mild MR, mild TR, mild AI, dilated aortic root 5.1 cm    TRANSESOPHAGEAL ECHOCARDIOGRAM  10/20/2017    1. TTE (10/20/17): LVEF 35-40%. Severely dilated LV. Mild to mod concentric LVH. Mid anterior hypokinesis, Inferior base and mid segments hypokinetic. Mild to mod MR. Trace to mild AI. Aortic root is mod dilated       Social Hx: reports that he has never smoked. He has quit using smokeless tobacco.  His smokeless tobacco use included chew. Assessment & Plan:   Please continue to take your prescribed medication including Lasix metoprolol and your anticoagulation  We will have you follow-up in 1 year with a CT chest with contrast to evaluate a sending aorta. Please seek emergency treatment if chest pain sharp back pain or shortness of breath occurs.   Continue to follow-up for your ablation on March 11 [address]

## 2021-02-25 NOTE — PATIENT INSTRUCTIONS
Please continue to take your prescribed medication including Lasix metoprolol and your anticoagulation  We will have you follow-up in 1 year with a CT chest with contrast to evaluate a sending aorta. Please seek emergency treatment if chest pain sharp back pain or shortness of breath occurs.   Continue to follow-up for your ablation on March 11

## 2021-03-07 ENCOUNTER — HOSPITAL ENCOUNTER (OUTPATIENT)
Dept: LAB | Age: 61
Setting detail: SPECIMEN
Discharge: HOME | End: 2021-03-07
Payer: COMMERCIAL

## 2021-03-07 DIAGNOSIS — Z01.818 PREOP TESTING: Primary | ICD-10-CM

## 2021-03-07 PROCEDURE — U0003 INFECTIOUS AGENT DETECTION BY NUCLEIC ACID (DNA OR RNA); SEVERE ACUTE RESPIRATORY SYNDROME CORONAVIRUS 2 (SARS-COV-2) (CORONAVIRUS DISEASE [COVID-19]), AMPLIFIED PROBE TECHNIQUE, MAKING USE OF HIGH THROUGHPUT TECHNOLOGIES AS DESCRIBED BY CMS-2020-01-R: HCPCS

## 2021-03-07 PROCEDURE — U0005 INFEC AGEN DETEC AMPLI PROBE: HCPCS

## 2021-03-08 LAB
SARS-COV-2: NORMAL
SARS-COV-2: NOT DETECTED
SOURCE: NORMAL

## 2021-03-09 ENCOUNTER — TELEPHONE (OUTPATIENT)
Dept: PRIMARY CARE CLINIC | Age: 61
End: 2021-03-09

## 2021-03-10 ENCOUNTER — ANESTHESIA EVENT (OUTPATIENT)
Dept: CARDIAC CATH/INVASIVE PROCEDURES | Age: 61
End: 2021-03-10
Payer: COMMERCIAL

## 2021-03-11 ENCOUNTER — ANESTHESIA (OUTPATIENT)
Dept: CARDIAC CATH/INVASIVE PROCEDURES | Age: 61
End: 2021-03-11
Payer: COMMERCIAL

## 2021-03-11 VITALS — TEMPERATURE: 97 F | DIASTOLIC BLOOD PRESSURE: 94 MMHG | OXYGEN SATURATION: 99 % | SYSTOLIC BLOOD PRESSURE: 131 MMHG

## 2021-03-11 PROCEDURE — 6360000002 HC RX W HCPCS: Performed by: NURSE ANESTHETIST, CERTIFIED REGISTERED

## 2021-03-11 PROCEDURE — 2580000003 HC RX 258: Performed by: NURSE ANESTHETIST, CERTIFIED REGISTERED

## 2021-03-11 PROCEDURE — 2500000003 HC RX 250 WO HCPCS: Performed by: NURSE ANESTHETIST, CERTIFIED REGISTERED

## 2021-03-11 RX ORDER — ROCURONIUM BROMIDE 10 MG/ML
INJECTION, SOLUTION INTRAVENOUS PRN
Status: DISCONTINUED | OUTPATIENT
Start: 2021-03-11 | End: 2021-03-11 | Stop reason: SDUPTHER

## 2021-03-11 RX ORDER — DEXAMETHASONE SODIUM PHOSPHATE 10 MG/ML
INJECTION, SOLUTION INTRAMUSCULAR; INTRAVENOUS PRN
Status: DISCONTINUED | OUTPATIENT
Start: 2021-03-11 | End: 2021-03-11 | Stop reason: SDUPTHER

## 2021-03-11 RX ORDER — SODIUM CHLORIDE, SODIUM LACTATE, POTASSIUM CHLORIDE, CALCIUM CHLORIDE 600; 310; 30; 20 MG/100ML; MG/100ML; MG/100ML; MG/100ML
INJECTION, SOLUTION INTRAVENOUS CONTINUOUS PRN
Status: DISCONTINUED | OUTPATIENT
Start: 2021-03-11 | End: 2021-03-11 | Stop reason: SDUPTHER

## 2021-03-11 RX ORDER — FENTANYL CITRATE 50 UG/ML
INJECTION, SOLUTION INTRAMUSCULAR; INTRAVENOUS PRN
Status: DISCONTINUED | OUTPATIENT
Start: 2021-03-11 | End: 2021-03-11 | Stop reason: SDUPTHER

## 2021-03-11 RX ORDER — PROTAMINE SULFATE 10 MG/ML
INJECTION, SOLUTION INTRAVENOUS PRN
Status: DISCONTINUED | OUTPATIENT
Start: 2021-03-11 | End: 2021-03-11 | Stop reason: SDUPTHER

## 2021-03-11 RX ORDER — MIDAZOLAM HYDROCHLORIDE 1 MG/ML
INJECTION INTRAMUSCULAR; INTRAVENOUS PRN
Status: DISCONTINUED | OUTPATIENT
Start: 2021-03-11 | End: 2021-03-11 | Stop reason: SDUPTHER

## 2021-03-11 RX ORDER — LIDOCAINE HYDROCHLORIDE 20 MG/ML
INJECTION, SOLUTION EPIDURAL; INFILTRATION; INTRACAUDAL; PERINEURAL PRN
Status: DISCONTINUED | OUTPATIENT
Start: 2021-03-11 | End: 2021-03-11 | Stop reason: SDUPTHER

## 2021-03-11 RX ORDER — HEPARIN SODIUM 10000 [USP'U]/ML
INJECTION, SOLUTION INTRAVENOUS; SUBCUTANEOUS PRN
Status: DISCONTINUED | OUTPATIENT
Start: 2021-03-11 | End: 2021-03-11 | Stop reason: SDUPTHER

## 2021-03-11 RX ORDER — PROPOFOL 10 MG/ML
INJECTION, EMULSION INTRAVENOUS PRN
Status: DISCONTINUED | OUTPATIENT
Start: 2021-03-11 | End: 2021-03-11 | Stop reason: SDUPTHER

## 2021-03-11 RX ORDER — GLYCOPYRROLATE 1 MG/5 ML
SYRINGE (ML) INTRAVENOUS PRN
Status: DISCONTINUED | OUTPATIENT
Start: 2021-03-11 | End: 2021-03-11 | Stop reason: SDUPTHER

## 2021-03-11 RX ORDER — ONDANSETRON 2 MG/ML
INJECTION INTRAMUSCULAR; INTRAVENOUS PRN
Status: DISCONTINUED | OUTPATIENT
Start: 2021-03-11 | End: 2021-03-11 | Stop reason: SDUPTHER

## 2021-03-11 RX ADMIN — PHENYLEPHRINE HYDROCHLORIDE 100 MCG: 10 INJECTION INTRAVENOUS at 13:49

## 2021-03-11 RX ADMIN — Medication 0.4 MG: at 14:11

## 2021-03-11 RX ADMIN — PHENYLEPHRINE HYDROCHLORIDE 100 MCG: 10 INJECTION INTRAVENOUS at 12:24

## 2021-03-11 RX ADMIN — FENTANYL CITRATE 50 MCG: 50 INJECTION, SOLUTION INTRAMUSCULAR; INTRAVENOUS at 13:02

## 2021-03-11 RX ADMIN — PROPOFOL 50 MG: 10 INJECTION, EMULSION INTRAVENOUS at 13:22

## 2021-03-11 RX ADMIN — PHENYLEPHRINE HYDROCHLORIDE 100 MCG: 10 INJECTION INTRAVENOUS at 12:35

## 2021-03-11 RX ADMIN — PHENYLEPHRINE HYDROCHLORIDE 100 MCG: 10 INJECTION INTRAVENOUS at 12:57

## 2021-03-11 RX ADMIN — PHENYLEPHRINE HYDROCHLORIDE 100 MCG: 10 INJECTION INTRAVENOUS at 13:17

## 2021-03-11 RX ADMIN — PHENYLEPHRINE HYDROCHLORIDE 100 MCG: 10 INJECTION INTRAVENOUS at 12:40

## 2021-03-11 RX ADMIN — PHENYLEPHRINE HYDROCHLORIDE 100 MCG: 10 INJECTION INTRAVENOUS at 13:42

## 2021-03-11 RX ADMIN — DEXAMETHASONE SODIUM PHOSPHATE 10 MG: 10 INJECTION INTRAMUSCULAR; INTRAVENOUS at 12:31

## 2021-03-11 RX ADMIN — PROTAMINE SULFATE 100 MG: 10 INJECTION, SOLUTION INTRAVENOUS at 13:58

## 2021-03-11 RX ADMIN — PHENYLEPHRINE HYDROCHLORIDE 100 MCG: 10 INJECTION INTRAVENOUS at 12:43

## 2021-03-11 RX ADMIN — HEPARIN SODIUM 5000 UNITS: 10000 INJECTION, SOLUTION INTRAVENOUS; SUBCUTANEOUS at 13:31

## 2021-03-11 RX ADMIN — FENTANYL CITRATE 50 MCG: 50 INJECTION, SOLUTION INTRAMUSCULAR; INTRAVENOUS at 12:15

## 2021-03-11 RX ADMIN — LIDOCAINE HYDROCHLORIDE 100 MG: 20 INJECTION, SOLUTION EPIDURAL; INFILTRATION; INTRACAUDAL; PERINEURAL at 12:15

## 2021-03-11 RX ADMIN — HEPARIN SODIUM 15000 UNITS: 10000 INJECTION, SOLUTION INTRAVENOUS; SUBCUTANEOUS at 12:47

## 2021-03-11 RX ADMIN — PHENYLEPHRINE HYDROCHLORIDE 100 MCG: 10 INJECTION INTRAVENOUS at 12:52

## 2021-03-11 RX ADMIN — FENTANYL CITRATE 100 MCG: 50 INJECTION, SOLUTION INTRAMUSCULAR; INTRAVENOUS at 12:21

## 2021-03-11 RX ADMIN — ROCURONIUM BROMIDE 50 MG: 10 INJECTION, SOLUTION INTRAVENOUS at 12:15

## 2021-03-11 RX ADMIN — SODIUM CHLORIDE, POTASSIUM CHLORIDE, SODIUM LACTATE AND CALCIUM CHLORIDE: 600; 310; 30; 20 INJECTION, SOLUTION INTRAVENOUS at 12:00

## 2021-03-11 RX ADMIN — PHENYLEPHRINE HYDROCHLORIDE 100 MCG: 10 INJECTION INTRAVENOUS at 13:30

## 2021-03-11 RX ADMIN — PHENYLEPHRINE HYDROCHLORIDE 100 MCG: 10 INJECTION INTRAVENOUS at 13:23

## 2021-03-11 RX ADMIN — PHENYLEPHRINE HYDROCHLORIDE 100 MCG: 10 INJECTION INTRAVENOUS at 12:37

## 2021-03-11 RX ADMIN — PHENYLEPHRINE HYDROCHLORIDE 100 MCG: 10 INJECTION INTRAVENOUS at 13:02

## 2021-03-11 RX ADMIN — PROPOFOL 50 MG: 10 INJECTION, EMULSION INTRAVENOUS at 13:20

## 2021-03-11 RX ADMIN — FENTANYL CITRATE 50 MCG: 50 INJECTION, SOLUTION INTRAMUSCULAR; INTRAVENOUS at 13:21

## 2021-03-11 RX ADMIN — PHENYLEPHRINE HYDROCHLORIDE 100 MCG: 10 INJECTION INTRAVENOUS at 12:47

## 2021-03-11 RX ADMIN — ONDANSETRON 4 MG: 2 INJECTION, SOLUTION INTRAMUSCULAR; INTRAVENOUS at 13:58

## 2021-03-11 RX ADMIN — MIDAZOLAM 2 MG: 1 INJECTION INTRAMUSCULAR; INTRAVENOUS at 12:13

## 2021-03-11 RX ADMIN — SODIUM CHLORIDE, POTASSIUM CHLORIDE, SODIUM LACTATE AND CALCIUM CHLORIDE: 600; 310; 30; 20 INJECTION, SOLUTION INTRAVENOUS at 13:41

## 2021-03-11 RX ADMIN — PHENYLEPHRINE HYDROCHLORIDE 100 MCG: 10 INJECTION INTRAVENOUS at 12:19

## 2021-03-11 RX ADMIN — NEOSTIGMINE METHYLSULFATE 3 MG: 1 INJECTION, SOLUTION INTRAMUSCULAR; INTRAVENOUS; SUBCUTANEOUS at 14:11

## 2021-03-11 RX ADMIN — PROPOFOL 200 MG: 10 INJECTION, EMULSION INTRAVENOUS at 12:15

## 2021-03-11 ASSESSMENT — PULMONARY FUNCTION TESTS
PIF_VALUE: 4
PIF_VALUE: 22
PIF_VALUE: 17
PIF_VALUE: 23
PIF_VALUE: 22
PIF_VALUE: 17
PIF_VALUE: 18
PIF_VALUE: 22
PIF_VALUE: 16
PIF_VALUE: 24
PIF_VALUE: 16
PIF_VALUE: 31
PIF_VALUE: 21
PIF_VALUE: 23
PIF_VALUE: 17
PIF_VALUE: 18
PIF_VALUE: 22
PIF_VALUE: 1
PIF_VALUE: 18
PIF_VALUE: 18
PIF_VALUE: 23
PIF_VALUE: 17
PIF_VALUE: 17
PIF_VALUE: 22
PIF_VALUE: 18
PIF_VALUE: 18
PIF_VALUE: 23
PIF_VALUE: 4
PIF_VALUE: 16
PIF_VALUE: 23
PIF_VALUE: 0
PIF_VALUE: 17
PIF_VALUE: 21
PIF_VALUE: 22
PIF_VALUE: 18
PIF_VALUE: 18
PIF_VALUE: 16
PIF_VALUE: 18
PIF_VALUE: 17
PIF_VALUE: 18
PIF_VALUE: 1
PIF_VALUE: 18
PIF_VALUE: 22
PIF_VALUE: 18
PIF_VALUE: 22
PIF_VALUE: 21
PIF_VALUE: 29
PIF_VALUE: 22
PIF_VALUE: 22
PIF_VALUE: 18
PIF_VALUE: 21
PIF_VALUE: 18
PIF_VALUE: 17
PIF_VALUE: 17
PIF_VALUE: 18
PIF_VALUE: 15
PIF_VALUE: 23
PIF_VALUE: 34
PIF_VALUE: 18
PIF_VALUE: 18
PIF_VALUE: 4
PIF_VALUE: 18
PIF_VALUE: 2
PIF_VALUE: 24
PIF_VALUE: 18
PIF_VALUE: 18
PIF_VALUE: 17
PIF_VALUE: 21
PIF_VALUE: 4
PIF_VALUE: 25
PIF_VALUE: 18
PIF_VALUE: 23
PIF_VALUE: 22

## 2021-03-11 NOTE — ANESTHESIA PRE PROCEDURE
Department of Anesthesiology  Preprocedure Note       Name:  Flor Cuenca   Age:  64 y.o.  :  1960                                          MRN:  [id number]         Date:  3/11/2021      Surgeon: * No surgeons listed *    Procedure: * No procedures listed *    Medications prior to admission:   Prior to Admission medications    Medication Sig Start Date End Date Taking?  Authorizing Provider   sacubitril-valsartan (ENTRESTO) 24-26 MG per tablet Take 1 tablet by mouth 2 times daily   Yes Historical Provider, MD   amiodarone (CORDARONE) 200 MG tablet Take 400 mg by mouth daily   Yes Historical Provider, MD   digoxin (LANOXIN) 125 MCG tablet Take 1 tablet by mouth daily 21  Yes Indigo Olmedo, DO   furosemide (LASIX) 40 MG tablet Take 1 tablet by mouth daily 21  Yes Indigo Olmedo, DO   potassium chloride (KLOR-CON M) 20 MEQ extended release tablet Take 1 tablet by mouth daily 21  Yes Indigo Olmedo, DO   apixaban (ELIQUIS) 5 MG TABS tablet Take 1 tablet by mouth 2 times daily 21  Yes Indigo Olmedo, DO   metoprolol tartrate (LOPRESSOR) 25 MG tablet Take 1 tablet by mouth 2 times daily 21  Yes Indigo Olmedo, DO   Multiple Vitamins-Minerals (THERAPEUTIC MULTIVITAMIN-MINERALS) tablet Take 1 tablet by mouth daily   Yes Historical Provider, MD   aspirin 81 MG tablet Take 81 mg by mouth daily   Yes Historical Provider, MD   atorvastatin (LIPITOR) 40 MG tablet Take 40 mg by mouth daily   Yes Historical Provider, MD       Current medications:    Current Facility-Administered Medications   Medication Dose Route Frequency Provider Last Rate Last Admin    0.9 % sodium chloride infusion   Intravenous Continuous Karolina Jamison MD        0.9 % sodium chloride infusion   Intravenous Continuous J Luis Mullen MD 40 mL/hr at 21 1114 New Bag at 21 1114    lactated ringers infusion   Intravenous Continuous Ndal Georgina Palacios MD        sodium chloride flush 0.9 % injection 10 mL  10 mL Intravenous 2 times per day Ness Kohli MD        sodium chloride flush 0.9 % injection 10 mL  10 mL Intravenous PRN Ness Kohli MD        lidocaine PF 1 % injection 1 mL  1 mL Intradermal Once PRN Ness Kohli MD           Allergies: Allergies   Allergen Reactions    Pcn [Penicillins] Anaphylaxis       Problem List:    Patient Active Problem List   Diagnosis Code    [facility] I42.8    Abnormal nuclear stress test R94.39    PVC (premature ventricular contraction) I49.3    Aortic root dilatation (HCC) I77.810    Aortic root aneurysm (HCC) I71.9    Ascending aortic aneurysm ([facility]) I71.2    MIRIAM on CPAP G47.33, Z99.89    Essential hypertension I10    Obesity (BMI 30-39. 9) E66.9    HFrEF (heart failure with reduced ejection fraction) ([facility]) I50.20    Persistent atrial fibrillation ([facility]) I48.19       Past Medical History:        Diagnosis Date    Arthritis     Atrial fibrillation ([facility] 01/20/2021    Congestive heart failure ([facility]     H/O cardiovascular stress test 11/29/2017    Lexiscan (11/29/17): Negative ECG portion. Small fixed inferior defect with no suggestion of ischemia.  H/O echocardiogram 01/19/2021    ECHO 1/19/2021: EF 10-15%, trace-mild AR, mild MR, moderate TR, RVSP is 45 mmHg.  History of alcoholism ([facility]     Hyperlipidemia     Hypertension     [facility]ischemic [facility]     S/P cardiac cath 12/19/2017    Sleep apnea     wears cpap       Past Surgical History:        Procedure Laterality Date    CARDIAC CATHETERIZATION  12/19/2017    Minimal coronary artery disease.  CARDIOVERSION  01/21/2021    JOINT REPLACEMENT Bilateral     knee    TRANSESOPHAGEAL ECHOCARDIOGRAM  01/21/2021    TRANSESOPHAGEAL ECHOCARDIOGRAM  08/08/2018    TTE (8/8/18): LVEF 40%, global hypokinesia, mild MR, mild TR, mild AI, dilated aortic root 5.1 cm    TRANSESOPHAGEAL ECHOCARDIOGRAM  10/20/2017    1. TTE (10/20/17): LVEF 35-40%.  Severely dilated LV. Mild to mod concentric LVH. Mid anterior hypokinesis, Inferior base and mid segments hypokinetic. Mild to mod MR. Trace to mild AI. Aortic root is mod dilated       Social History:    Social History     Tobacco Use    Smoking status: Never Smoker    Smokeless tobacco: Former User     Types: Chew   Substance Use Topics    Alcohol use: No     Comment: hx. of alcoholism                                Counseling given: Not Answered      Vital Signs (Current):   Vitals:    03/11/21 1031 03/11/21 1032   BP:  128/85   Pulse:  99   Resp:  16   Temp:  96 °F (35.6 °C)   TempSrc:  Temporal   SpO2:  97%   Weight: (!) 310 lb (140.6 kg)    Height: 6' 3\" (1.905 m)                                               BP Readings from Last 3 Encounters:   03/11/21 128/85   02/25/21 117/80   01/22/21 110/84       NPO Status: Time of last liquid consumption: 2000                        Time of last solid consumption: 2000                        Date of last liquid consumption: 03/10/21                        Date of last solid food consumption: 03/10/21    BMI:   Wt Readings from Last 3 Encounters:   03/11/21 (!) 310 lb (140.6 kg)   02/25/21 (!) 307 lb (139.3 kg)   01/20/21 (!) 318 lb 12.8 oz (144.6 kg)     Body mass index is 38.75 kg/m².     CBC:   Lab Results   Component Value Date    WBC 8.3 03/11/2021    RBC 4.90 03/11/2021    HGB 13.7 03/11/2021    HCT 42.2 03/11/2021    MCV 86.1 03/11/2021    RDW 13.6 03/11/2021     03/11/2021       CMP:   Lab Results   Component Value Date     03/11/2021    K 4.4 03/11/2021     03/11/2021    CO2 24 03/11/2021    BUN 11 03/11/2021    CREATININE 0.68 03/11/2021    GFRAA >60 03/11/2021    LABGLOM >60 03/11/2021    GLUCOSE 113 03/11/2021    PROT 6.7 01/20/2021    CALCIUM 8.8 03/11/2021    BILITOT 0.42 01/20/2021    ALKPHOS 79 01/20/2021    AST 15 01/20/2021    ALT 18 01/20/2021       POC Tests: No results for input(s): POCGLU, POCNA, POCK, POCCL, POCBUN, POCHEMO, POCHCT in the last 72 hours. Coags:   Lab Results   Component Value Date    PROTIME 12.6 03/11/2021    INR 1.0 03/11/2021    APTT 28.0 01/20/2021       HCG (If Applicable): No results found for: PREGTESTUR, PREGSERUM, HCG, HCGQUANT     ABGs: No results found for: PHART, PO2ART, RWK9EFL, AZT4YQU, BEART, J2LWYUHN     Type & Screen (If Applicable):  No results found for: LABABO, LABRH    Drug/Infectious Status (If Applicable):  No results found for: HIV, HEPCAB    COVID-19 Screening (If Applicable):   Lab Results   Component Value Date    COVID19 Not Detected 03/07/2021         Anesthesia Evaluation  Patient summary reviewed and Nursing notes reviewed no history of anesthetic complications:   Airway: Mallampati: II  TM distance: >3 FB   Neck ROM: full  Mouth opening: > = 3 FB Dental: normal exam         Pulmonary:normal exam    (+) sleep apnea:                             Cardiovascular:    (+) hypertension:, valvular problems/murmurs:, dysrhythmias: atrial fibrillation, CHF:,         Rhythm: irregular                   ROS comment: Cardiomyopathy  Aortic root aneurysm  Atrial fib     Neuro/Psych:               GI/Hepatic/Renal:             Endo/Other:                     Abdominal:           Vascular:                                        Anesthesia Plan      general     ASA 3       Induction: intravenous. arterial line  MIPS: Postoperative opioids intended and Prophylactic antiemetics administered. Anesthetic plan and risks discussed with patient. Plan discussed with CRNA.                   Daya Philippe MD   3/11/2021

## 2021-03-12 NOTE — ANESTHESIA POSTPROCEDURE EVALUATION
POST- ANESTHESIA EVALUATION       Pt Name: Jameel Herrera  MRN: [id number]  YOB: 1960  Date of evaluation: 3/12/2021  Time:  4:03 PM      /73   Pulse 69   Temp 97.8 °F (36.6 °C) (Temporal)   Resp 16   Ht 6' 3\" (1.905 m)   Wt (!) 310 lb (140.6 kg)   SpO2 100%   BMI 38.75 kg/m²      Consciousness Level  Awake  Cardiopulmonary Status  Stable  Pain Adequately Treated YES  Nausea / Vomiting  NO  Adequate Hydration  YES  Anesthesia Related Complications NONE      Electronically signed by More Blas MD on 3/12/2021 at 4:03 PM       Department of Anesthesiology  Postprocedure Note    Patient: Jameel Herrera  MRN: [id number]  YOB: 1960  Date of evaluation: 3/12/2021  Time:  4:03 PM     Procedure Summary     Date: 03/11/21 Room / Location: [facility]    Anesthesia Start: 1200 Anesthesia Stop: 1430    Procedure: ABLATION Diagnosis:       AF (atrial fibrillation) ([address]      Persistent atrial fibrillation ([address]    Scheduled Providers:  Responsible Provider: More Blas MD    Anesthesia Type: general ASA Status: 3          Anesthesia Type: general    Alan Phase I: Alan Score: 9    Alan Phase II:      Last vitals: Reviewed and per EMR flowsheets.        Anesthesia Post Evaluation

## 2022-02-14 RX ORDER — METOPROLOL TARTRATE 50 MG/1
TABLET, FILM COATED ORAL
Qty: 180 TABLET | Refills: 4 | OUTPATIENT
Start: 2022-02-14

## 2022-02-16 RX ORDER — METOPROLOL TARTRATE 50 MG/1
TABLET, FILM COATED ORAL
Qty: 180 TABLET | Refills: 4 | OUTPATIENT
Start: 2022-02-16

## 2022-02-22 ENCOUNTER — TELEPHONE (OUTPATIENT)
Dept: CARDIOTHORACIC SURGERY | Age: 62
End: 2022-02-22

## 2022-02-22 NOTE — TELEPHONE ENCOUNTER
Spoke to patient this morning in regards to his upcoming appointment tomorrow. Explained to him that he would need testing prior to that appointment. Pt states he does not want to be seen and he would like this to be canceled. Explained to him that he should follow up with his PCP regarding his aneurysm so someone is following size. Pt verbalized understanding. Canceled appointment at this time.

## 2022-03-20 ENCOUNTER — APPOINTMENT (OUTPATIENT)
Dept: GENERAL RADIOLOGY | Age: 62
End: 2022-03-20
Payer: COMMERCIAL

## 2022-03-20 ENCOUNTER — HOSPITAL ENCOUNTER (EMERGENCY)
Age: 62
Discharge: HOME | End: 2022-03-20
Attending: EMERGENCY MEDICINE
Payer: COMMERCIAL

## 2022-03-20 ENCOUNTER — APPOINTMENT (OUTPATIENT)
Dept: CT IMAGING | Age: 62
End: 2022-03-20
Payer: COMMERCIAL

## 2022-03-20 VITALS
TEMPERATURE: 99.2 F | OXYGEN SATURATION: 93 % | SYSTOLIC BLOOD PRESSURE: 116 MMHG | WEIGHT: 310 LBS | HEART RATE: 66 BPM | HEIGHT: 75 IN | BODY MASS INDEX: 38.54 KG/M2 | DIASTOLIC BLOOD PRESSURE: 78 MMHG | RESPIRATION RATE: 18 BRPM

## 2022-03-20 DIAGNOSIS — R07.9 CHEST PAIN, UNSPECIFIED TYPE: Primary | ICD-10-CM

## 2022-03-20 LAB
ABSOLUTE EOS #: 0.42 K/UL (ref 0–0.44)
ABSOLUTE IMMATURE GRANULOCYTE: 0.03 K/UL (ref 0–0.3)
ABSOLUTE LYMPH #: 2.22 K/UL (ref 1.1–3.7)
ABSOLUTE MONO #: 0.89 K/UL (ref 0.1–1.2)
ANION GAP SERPL CALCULATED.3IONS-SCNC: 13 MMOL/L (ref 9–17)
BASOPHILS # BLD: 1 % (ref 0–2)
BASOPHILS ABSOLUTE: 0.09 K/UL (ref 0–0.2)
BUN BLDV-MCNC: 11 MG/DL (ref 8–23)
CALCIUM SERPL-MCNC: 8.9 MG/DL (ref 8.6–10.4)
CHLORIDE BLD-SCNC: 109 MMOL/L (ref 98–107)
CO2: 23 MMOL/L (ref 20–31)
CREAT SERPL-MCNC: 1.02 MG/DL (ref 0.7–1.2)
DIGOXIN LEVEL: 0.6 NG/ML (ref 0.5–2)
EOSINOPHILS RELATIVE PERCENT: 6 % (ref 1–4)
GFR AFRICAN AMERICAN: >60 ML/MIN
GFR NON-AFRICAN AMERICAN: >60 ML/MIN
GFR SERPL CREATININE-BSD FRML MDRD: ABNORMAL ML/MIN/{1.73_M2}
GLUCOSE BLD-MCNC: 102 MG/DL (ref 70–99)
HCT VFR BLD CALC: 42.7 % (ref 40.7–50.3)
HEMOGLOBIN: 14.6 G/DL (ref 13–17)
IMMATURE GRANULOCYTES: 0 %
LYMPHOCYTES # BLD: 32 % (ref 24–43)
MCH RBC QN AUTO: 29.7 PG (ref 25.2–33.5)
MCHC RBC AUTO-ENTMCNC: 34.2 G/DL (ref 28.4–34.8)
MCV RBC AUTO: 87 FL (ref 82.6–102.9)
MONOCYTES # BLD: 13 % (ref 3–12)
NRBC AUTOMATED: 0 PER 100 WBC
PDW BLD-RTO: 12.6 % (ref 11.8–14.4)
PLATELET # BLD: 234 K/UL (ref 138–453)
PMV BLD AUTO: 9.1 FL (ref 8.1–13.5)
POTASSIUM SERPL-SCNC: 4.5 MMOL/L (ref 3.7–5.3)
PRO-BNP: 156 PG/ML
RBC # BLD: 4.91 M/UL (ref 4.21–5.77)
SEG NEUTROPHILS: 48 % (ref 36–65)
SEGMENTED NEUTROPHILS ABSOLUTE COUNT: 3.28 K/UL (ref 1.5–8.1)
SODIUM BLD-SCNC: 145 MMOL/L (ref 135–144)
TROPONIN, HIGH SENSITIVITY: <6 NG/L (ref 0–22)
TROPONIN, HIGH SENSITIVITY: <6 NG/L (ref 0–22)
WBC # BLD: 6.9 K/UL (ref 3.5–11.3)

## 2022-03-20 PROCEDURE — 71045 X-RAY EXAM CHEST 1 VIEW: CPT

## 2022-03-20 PROCEDURE — 71275 CT ANGIOGRAPHY CHEST: CPT

## 2022-03-20 PROCEDURE — 83880 ASSAY OF NATRIURETIC PEPTIDE: CPT

## 2022-03-20 PROCEDURE — 99284 EMERGENCY DEPT VISIT MOD MDM: CPT

## 2022-03-20 PROCEDURE — 80048 BASIC METABOLIC PNL TOTAL CA: CPT

## 2022-03-20 PROCEDURE — 84484 ASSAY OF TROPONIN QUANT: CPT

## 2022-03-20 PROCEDURE — 6360000004 HC RX CONTRAST MEDICATION: Performed by: STUDENT IN AN ORGANIZED HEALTH CARE EDUCATION/TRAINING PROGRAM

## 2022-03-20 PROCEDURE — 93005 ELECTROCARDIOGRAM TRACING: CPT | Performed by: STUDENT IN AN ORGANIZED HEALTH CARE EDUCATION/TRAINING PROGRAM

## 2022-03-20 PROCEDURE — 85025 COMPLETE CBC W/AUTO DIFF WBC: CPT

## 2022-03-20 PROCEDURE — 80162 ASSAY OF DIGOXIN TOTAL: CPT

## 2022-03-20 RX ADMIN — IOPAMIDOL 100 ML: 755 INJECTION, SOLUTION INTRAVENOUS at 16:57

## 2022-03-20 ASSESSMENT — ENCOUNTER SYMPTOMS
VOMITING: 0
ABDOMINAL PAIN: 0
SHORTNESS OF BREATH: 0
NAUSEA: 0
COUGH: 0

## 2022-03-20 NOTE — ED NOTES
Pt. Discharge instructions reviewed. Pt has no further questions at this time.       Brandan Reis RN  03/20/22 0748

## 2022-03-20 NOTE — ED PROVIDER NOTES
[facility]     Emergency Department     Faculty Attestation    I performed a history and physical examination of the patient and discussed management with the resident. I have reviewed and agree with the residents findings including all diagnostic interpretations, and treatment plans as written at the time of my review. Any areas of disagreement are noted on the chart. I was personally present for the key portions of any procedures. I have documented in the chart those procedures where I was not present during the key portions. For Physician Assistant/ Nurse Practitioner cases/documentation I have personally evaluated this patient and have completed at least one if not all key elements of the E/M (history, physical exam, and MDM). Additional findings are as noted. This patient was evaluated in the Emergency Department for symptoms described in the history of present illness. The patient was evaluated in the context of the global COVID-19 pandemic, which necessitated consideration that the patient might be at risk for infection with the SARS-CoV-2 virus that causes COVID-19. Institutional protocols and algorithms that pertain to the evaluation of patients at risk for COVID-19 are in a state of rapid change based on information released by regulatory bodies including the CDC and federal and state organizations. These policies and algorithms were followed during the patient's care in the ED. Primary Care Physician: Jj Garcia CNP, APRN - CNP    History: This is a 58 y.o. male who presents to the Emergency Department with complaint of chest pain. The patient presents to emergency room complaining of chest pain sharp in nature that began at 7 AM.  He had no associated shortness of breath nausea vomiting or diaphoresis. Patient states pain is worse with with any movement better if he sits still.     Physical:   height is 6' 3\" (1.905 m) and weight is 310 lb (140.6 kg) (abnormal). His temperature is 99.2 °F (37.3 °C). His blood pressure is 138/86 and his pulse is 68. His respiration is 18 and oxygen saturation is 93%. Lungs are clear auscultation bilateral, heart regular rate and rhythm, abdomen is soft nontender    Impression: Chest pain    Plan: Chest x-ray, EKG, CBC, BMP, troponin      EKG Interpretation    Interpreted by me  Sinus rhythm with a first-degree AV block and a ventricular to 67, normal QRS duration, left axis deviation, cannot rule out anterior infarct, age undetermined,  Compared EKG of March 11, 2021, first-degree AV block is new, QT corrected has shortened          (Please note that portions of this note were completed with a voice recognition program.  Efforts were made to edit the dictations but occasionally words are mis-transcribed.)    Mickey Joseph.  Shoaib Haas MD, [address]  Attending Emergency Medicine Physician        Kanchan Gallagher MD  03/20/22 [id number]

## 2022-03-20 NOTE — ED PROVIDER NOTES
Spouse name: Not on file    Number of children: Not on file    Years of education: Not on file    Highest education level: Not on file   Occupational History    Not on file   Tobacco Use    Smoking status: Never Smoker    Smokeless tobacco: Current User     Types: Chew   Substance and Sexual Activity    Alcohol use: No     Comment: hx. of alcoholism    Drug use: No    Sexual activity: Yes   Other Topics Concern    Not on file   Social History Narrative    Not on file     Social Determinants of Health     Financial Resource Strain:     Difficulty of Paying Living Expenses: Not on file   Food Insecurity:     Worried About Running Out of Food in the Last Year: Not on file    Wilmer of Food in the Last Year: Not on file   Transportation Needs:     Lack of Transportation (Medical): Not on file    Lack of Transportation (Non-Medical): Not on file   Physical Activity:     Days of Exercise per Week: Not on file    Minutes of Exercise per Session: Not on file   Stress:     Feeling of Stress : Not on file   Social Connections:     Frequency of Communication with Friends and Family: Not on file    Frequency of Social Gatherings with Friends and Family: Not on file    Attends Confucianist Services: Not on file    Active Member of [address] [place] [address] [employer] or Organizations: Not on file    Attends Club or Organization Meetings: Not on file    Marital Status: Not on file   Intimate Partner Violence:     Fear of Current or Ex-Partner: Not on file    Emotionally Abused: Not on file    Physically Abused: Not on file    Sexually Abused: Not on file   Housing Stability:     Unable to Pay for Housing in the Last Year: Not on file    Number of [place] in the Last Year: Not on file    Unstable Housing in the Last Year: Not on file       I counseled the patient against using tobacco products. History reviewed. No pertinent family history. No other pertinent FamHx on review with patient/guardian.     Allergies:  Pcn [penicillins]    Home Medications:  Prior to Admission medications    Medication Sig Start Date End Date Taking? Authorizing Provider   sacubitril-valsartan (ENTRESTO) 24-26 MG per tablet Take 1 tablet by mouth 2 times daily    Historical Provider, MD   amiodarone (CORDARONE) 200 MG tablet Take 400 mg by mouth daily    Historical Provider, MD   digoxin (LANOXIN) 125 MCG tablet Take 1 tablet by mouth daily 1/23/21   Girma Hernandez DO   furosemide (LASIX) 40 MG tablet Take 1 tablet by mouth daily 1/22/21   Demi Cloud,    potassium chloride (KLOR-CON M) 20 MEQ extended release tablet Take 1 tablet by mouth daily 1/22/21   Girma Hernandez DO   apixaban (ELIQUIS) 5 MG TABS tablet Take 1 tablet by mouth 2 times daily 1/21/21   Girma Hernandez,    metoprolol tartrate (LOPRESSOR) 25 MG tablet Take 1 tablet by mouth 2 times daily 1/21/21   Girma Hernandez,    Multiple Vitamins-Minerals (THERAPEUTIC MULTIVITAMIN-MINERALS) tablet Take 1 tablet by mouth daily    Historical Provider, MD   aspirin 81 MG tablet Take 81 mg by mouth daily    Historical Provider, MD   atorvastatin (LIPITOR) 40 MG tablet Take 40 mg by mouth daily    Historical Provider, MD       REVIEW OF SYSTEMS    (2-9 systems for level 4, 10 ormore for level 5)      Review of Systems   Constitutional: Negative for chills and fever. Eyes: Negative for visual disturbance. Respiratory: Negative for cough and shortness of breath. Cardiovascular: Positive for chest pain. Negative for palpitations. Gastrointestinal: Negative for abdominal pain, nausea and vomiting. Genitourinary: Negative for flank pain. Skin: Negative for rash. Allergic/Immunologic: Negative for immunocompromised state. Neurological: Negative for dizziness, weakness and headaches. Hematological: Does not bruise/bleed easily.        PHYSICAL EXAM   (up to 7 for level 4, 8 or more for level 5)      INITIAL VITALS:   /78   Pulse 66   Temp 99.2 °F (37.3 °C)   Resp 20   Ht 6' 3\" (1.905 m)   Wt (!) 310 lb (140.6 kg)   SpO2 93%   BMI 38.75 kg/m²     Physical Exam  Constitutional:       General: He is not in acute distress. Appearance: Normal appearance. He is not ill-appearing, toxic-appearing or diaphoretic. HENT:      Head: Normocephalic and atraumatic. Right Ear: External ear normal.      Left Ear: External ear normal.   Eyes:      General:         Right eye: No discharge. Left eye: No discharge. Cardiovascular:      Rate and Rhythm: Normal rate and regular rhythm. Pulses: Normal pulses. Heart sounds: No murmur heard. Pulmonary:      Effort: Pulmonary effort is normal. No respiratory distress. Breath sounds: Normal breath sounds. No wheezing, rhonchi or rales. Abdominal:      Palpations: Abdomen is soft. Tenderness: There is no abdominal tenderness. Musculoskeletal:      Right lower leg: No edema. Left lower leg: No edema. Skin:     Capillary Refill: Capillary refill takes less than 2 seconds. Neurological:      General: No focal deficit present. Mental Status: He is alert.          DIFFERENTIAL  DIAGNOSIS     PLAN (LABS / IMAGING / EKG):  Orders Placed This Encounter   Procedures    XR CHEST PORTABLE    CTA CHEST W WO CONTRAST    CBC with Auto Differential    Basic Metabolic Panel w/ Reflex to MG    Troponin    Brain Natriuretic Peptide    Digoxin Level    EKG 12 Lead    Insert peripheral IV       MEDICATIONS ORDERED:  Orders Placed This Encounter   Medications    iopamidol (ISOVUE-370) 76 % injection 100 mL       DIAGNOSTIC RESULTS / EMERGENCY DEPARTMENT COURSE / MDM     LABS:  Results for orders placed or performed during the hospital encounter of 03/20/22   CBC with Auto Differential   Result Value Ref Range    WBC 6.9 3.5 - 11.3 k/uL    RBC 4.91 4.21 - 5.77 m/uL    Hemoglobin 14.6 13.0 - 17.0 g/dL    Hematocrit 42.7 40.7 - 50.3 %    MCV 87.0 82.6 - 102.9 fL    MCH 29.7 25.2 - 33.5 pg    MCHC 34.2 28.4 - 34.8 g/dL    RDW 12.6 11.8 - 14.4 %    Platelets 180 435 - 415 k/uL    MPV 9.1 8.1 - 13.5 fL    NRBC Automated 0.0 0.0 per 100 WBC    Seg Neutrophils 48 36 - 65 %    Lymphocytes 32 24 - 43 %    Monocytes 13 (H) 3 - 12 %    Eosinophils % 6 (H) 1 - 4 %    Basophils 1 0 - 2 %    Immature Granulocytes 0 0 %    Segs Absolute 3.28 1.50 - 8.10 k/uL    Absolute Lymph # 2.22 1.10 - 3.70 k/uL    Absolute Mono # 0.89 0.10 - 1.20 k/uL    Absolute Eos # 0.42 0.00 - 0.44 k/uL    Basophils Absolute 0.09 0.00 - 0.20 k/uL    Absolute Immature Granulocyte 0.03 0.00 - 0.30 k/uL   Basic Metabolic Panel w/ Reflex to MG   Result Value Ref Range    Glucose 102 (H) 70 - 99 mg/dL    BUN 11 8 - 23 mg/dL    CREATININE 1.02 0.70 - 1.20 mg/dL    Calcium 8.9 8.6 - 10.4 mg/dL    Sodium 145 (H) 135 - 144 mmol/L    Potassium 4.5 3.7 - 5.3 mmol/L    Chloride 109 (H) 98 - 107 mmol/L    CO2 23 20 - 31 mmol/L    Anion Gap 13 9 - 17 mmol/L    GFR Non-African American >60 >60 mL/min    GFR African American >60 >60 mL/min    GFR Comment         Troponin   Result Value Ref Range    Troponin, High Sensitivity <6 0 - 22 ng/L   Troponin   Result Value Ref Range    Troponin, High Sensitivity <6 0 - 22 ng/L   Brain Natriuretic Peptide   Result Value Ref Range    Pro- <300 pg/mL   Digoxin Level   Result Value Ref Range    Digoxin Lvl 0.6 0.5 - 2.0 ng/mL       IMPRESSION/MDM/ED COURSE:  58 y.o. male presented with pleuritic chest pain that began this morning. Patient afebrile vitals WNL. On exam patient resting company no acute distress, nontoxic, nondiaphoretic. Heart RRR, lungs clear. Radial and pedal pulses symmetric. BP symmetric bilaterally without hypertension. Abdomen soft nontender. No focal neurologic deficits. Will obtain cardiac work-up including BMP and digoxin level. Will obtain CTA given history of aortic aneurysm with root dilation.       ED Course as of 03/20/22 1816   Jenna Santana Mar 20, 2022   1529 CBC with Auto Differential(!):    WBC 6.9   RBC 4.91   Hemoglobin Quant 14.6   Hematocrit 42.7   MCV 87.0   MCH 29.7   MCHC 34.2   RDW 12.6   Platelet Count 870   MPV 9.1   NRBC Automated 0.0   Seg Neutrophils 48   Lymphocytes 32   Monocytes 13(!)   Eosinophils % 6(!)   Basophils 1   Immature Granulocytes 0   Segs Absolute 3.28   Absolute Lymph # 2.22   Absolute Mono # 0.89   Absolute Eos # 0.42   Basophils Absolute 0.09   Absolute Immature Granulocyte 0.03 [AF]   1529 Brain Natriuretic Peptide:    Pro- [AF]   0231 Basic Metabolic Panel w/ Reflex to MG(!):    GLUCOSE, FASTING,(!)   BUN,BUNPL 11   Creatinine 1.02   CALCIUM, SERUM, 311944 8.9   Sodium 145(!)   Potassium 4.5   Chloride 109(!)   CO2 23   Anion Gap 13   GFR Non- >60   GFR  >60   GFR Comment      [AF]   1529 Troponin:    Troponin, High Sensitivity <6 [AF]   1529 IMPRESSION:  Stable chest with no acute parenchymal abnormality demonstrated. [AF]   1543 Digoxin Level:    Digoxin Lvl 0.6 [AF]   1813 IMPRESSION:  1. No acute arterial findings in the chest.  Specifically, no findings of  aortic dissection or pulmonary embolism. 2. No significant change in dilation of the aortic root primarily involving  the sinotubular junction and sinuses of Valsalva. While incompletely  evaluated, the aortic valve appears normal.  3. Incomplete evaluation of at least mild coronary atherosclerosis. 4. Mild bronchial wall thickening potentially due to pulmonary vascular  congestion, reactive airways disease, or bronchitis. [AF]   1813 Negative cardiac work-up without evidence of dissection/PE. Recommend follow-up with cardiology as soon as possible. Tylenol and/or ibuprofen as needed for pain. Strict return precautions. I discussed signs and symptoms that would require reevaluation in the ED. The patient expressed understanding and agreement with plan. All questions answered.  [AF]      ED Course User Index  [AF] Erma Patino, DO       RADIOLOGY:  CTA CHEST W WO CONTRAST   Final Result   1. No acute arterial findings in the chest.  Specifically, no findings of   aortic dissection or pulmonary embolism. 2. No significant change in dilation of the aortic root primarily involving   the sinotubular junction and sinuses of Valsalva. While incompletely   evaluated, the aortic valve appears normal.   3. Incomplete evaluation of at least mild coronary atherosclerosis. 4. Mild bronchial wall thickening potentially due to pulmonary vascular   congestion, reactive airways disease, or bronchitis. XR CHEST PORTABLE   Final Result   Stable chest with no acute parenchymal abnormality demonstrated. EKG  Sinus rhythm with first-degree AV block. Left axis deviation. No ST elevation or depression. Nonspecific ST changes, unchanged from EKG 3/11/2021    All EKG's are interpreted by the Emergency Department Physician who either signs or Co-signs this chart in the absence of a cardiologist.    PROCEDURES:  None    CONSULTS:  None    FINAL IMPRESSION      1.  Chest pain, unspecified type          DISPOSITION / PLAN     DISPOSITION        PATIENT REFERREDTO:  Gerson Storm DO  [address] [place] [address] 6  [place] [address] Preeti Perez    Schedule an appointment as soon as possible for a visit         DISCHARGE MEDICATIONS:  New Prescriptions    No medications on file       Gela Marks DO  PGY 2  Resident Physician Emergency Medicine  03/20/22 6:16 PM    (Please note that portions of this note were completed with a voice recognition program.Efforts were made to edit the dictations but occasionally words are mis-transcribed.)       Stanley Pavon DO  Resident  03/20/22 3523

## 2022-03-20 NOTE — ED NOTES
Pt. Presents ot the ER for chest pain on the left side that does not radiate. Pt has a long history of cardiac related problems. Pt denies and SOB at this time. Pt states that the cp worsens on inhalation. Pt placed on full cardiac monitor. Pt ekg, line, and labs completed. Dr. Misty Wheeler at the bedside for evaluation at this time. Will continue to update pt on poc.      Joao Regalado RN  03/20/22 [id number]

## 2022-03-22 LAB
EKG ATRIAL RATE: 67 BPM
EKG P AXIS: 46 DEGREES
EKG P-R INTERVAL: 210 MS
EKG Q-T INTERVAL: 428 MS
EKG QRS DURATION: 112 MS
EKG QTC CALCULATION (BAZETT): 452 MS
EKG R AXIS: -16 DEGREES
EKG T AXIS: 30 DEGREES
EKG VENTRICULAR RATE: 67 BPM

## 2022-03-22 PROCEDURE — 93010 ELECTROCARDIOGRAM REPORT: CPT | Performed by: INTERNAL MEDICINE

## 2022-09-06 RX ORDER — DIGOXIN 125 MCG
TABLET ORAL
Qty: 90 TABLET | Refills: 0 | OUTPATIENT
Start: 2022-09-06

## 2022-09-06 RX ORDER — FUROSEMIDE 40 MG/1
TABLET ORAL
Qty: 90 TABLET | Refills: 0 | OUTPATIENT
Start: 2022-09-06

## 2022-09-06 RX ORDER — SACUBITRIL AND VALSARTAN 24; 26 MG/1; MG/1
TABLET, FILM COATED ORAL
Qty: 180 TABLET | Refills: 4 | OUTPATIENT
Start: 2022-09-06

## 2022-09-06 RX ORDER — POTASSIUM CHLORIDE 1500 MG/1
TABLET, FILM COATED, EXTENDED RELEASE ORAL
Qty: 90 TABLET | Refills: 0 | OUTPATIENT
Start: 2022-09-06

## 2022-11-02 RX ORDER — SACUBITRIL AND VALSARTAN 24; 26 MG/1; MG/1
TABLET, FILM COATED ORAL
Qty: 60 TABLET | Refills: 0 | OUTPATIENT
Start: 2022-11-02

## 2022-11-28 RX ORDER — SACUBITRIL AND VALSARTAN 24; 26 MG/1; MG/1
TABLET, FILM COATED ORAL
Qty: 60 TABLET | Refills: 0 | OUTPATIENT
Start: 2022-11-28

## 2022-11-28 RX ORDER — DIGOXIN 125 MCG
TABLET ORAL
Qty: 90 TABLET | Refills: 0 | OUTPATIENT
Start: 2022-11-28

## 2022-11-28 RX ORDER — POTASSIUM CHLORIDE 20 MEQ/1
TABLET, EXTENDED RELEASE ORAL
Qty: 90 TABLET | Refills: 0 | OUTPATIENT
Start: 2022-11-28

## 2022-12-03 DIAGNOSIS — I48.91 ATRIAL FIBRILLATION WITH RVR (HCC): ICD-10-CM

## 2022-12-05 RX ORDER — SACUBITRIL AND VALSARTAN 24; 26 MG/1; MG/1
TABLET, FILM COATED ORAL
Qty: 60 TABLET | Refills: 0 | OUTPATIENT
Start: 2022-12-05

## 2022-12-05 RX ORDER — APIXABAN 5 MG/1
TABLET, FILM COATED ORAL
Qty: 60 TABLET | Refills: 0 | OUTPATIENT
Start: 2022-12-05

## 2024-05-07 ENCOUNTER — HOSPITAL ENCOUNTER (OUTPATIENT)
Dept: CT IMAGING | Facility: CLINIC | Age: 64
Discharge: HOME | End: 2024-05-09
Attending: INTERNAL MEDICINE
Payer: COMMERCIAL

## 2024-05-07 DIAGNOSIS — Q25.43 SINUS OF VALSALVA ANEURYSM: ICD-10-CM

## 2024-05-07 LAB
CREAT SERPL-MCNC: 0.7 MG/DL (ref 0.7–1.2)
GFR, ESTIMATED: >90 ML/MIN/1.73M2

## 2024-05-07 PROCEDURE — 71260 CT THORAX DX C+: CPT

## 2024-05-07 PROCEDURE — 82565 ASSAY OF CREATININE: CPT

## 2024-05-07 PROCEDURE — 2580000003 HC RX 258: Performed by: INTERNAL MEDICINE

## 2024-05-07 PROCEDURE — 6360000004 HC RX CONTRAST MEDICATION: Performed by: INTERNAL MEDICINE

## 2024-05-07 RX ORDER — SODIUM CHLORIDE 0.9 % (FLUSH) 0.9 %
10 SYRINGE (ML) INJECTION PRN
Status: DISCONTINUED | OUTPATIENT
Start: 2024-05-07 | End: 2024-05-10 | Stop reason: HOSPADM

## 2024-05-07 RX ORDER — 0.9 % SODIUM CHLORIDE 0.9 %
70 INTRAVENOUS SOLUTION INTRAVENOUS ONCE
Status: DISCONTINUED | OUTPATIENT
Start: 2024-05-07 | End: 2024-05-10 | Stop reason: HOSPADM

## 2024-05-07 RX ADMIN — Medication 70 ML: at 08:30

## 2024-05-07 RX ADMIN — SODIUM CHLORIDE, PRESERVATIVE FREE 10 ML: 5 INJECTION INTRAVENOUS at 08:30

## 2024-05-07 RX ADMIN — IOPAMIDOL 75 ML: 755 INJECTION, SOLUTION INTRAVENOUS at 08:29

## 2025-03-10 ENCOUNTER — HOSPITAL ENCOUNTER (OUTPATIENT)
Age: 65
Discharge: HOME | End: 2025-03-12
Attending: INTERNAL MEDICINE
Payer: COMMERCIAL

## 2025-03-10 VITALS
SYSTOLIC BLOOD PRESSURE: 105 MMHG | OXYGEN SATURATION: 98 % | RESPIRATION RATE: 18 BRPM | WEIGHT: 310 LBS | DIASTOLIC BLOOD PRESSURE: 57 MMHG | BODY MASS INDEX: 38.75 KG/M2 | HEART RATE: 79 BPM

## 2025-03-10 DIAGNOSIS — I48.19 PERSISTENT ATRIAL FIBRILLATION (HCC): Primary | ICD-10-CM

## 2025-03-10 DIAGNOSIS — I48.20 CHRONIC A-FIB (HCC): ICD-10-CM

## 2025-03-10 LAB
BUN BLD-MCNC: 16 MG/DL (ref 8–26)
CHLORIDE BLD-SCNC: 107 MMOL/L (ref 98–107)
ECHO LV EF PHYSICIAN: 35 %
ECHO MV REGURGITANT ALIASING (NYQUIST) VELOCITY: 32 CM/S
ECHO MV REGURGITANT PEAK GRADIENT: 100 MMHG
ECHO MV REGURGITANT PEAK VELOCITY: 5 M/S
ECHO MV REGURGITANT RADIUS PISA: 0.8 CM
ECHO MV REGURGITANT VTIA: 147 CM
EGFR, POC: >90 ML/MIN/1.73M2
GLUCOSE BLD-MCNC: 97 MG/DL (ref 74–100)
HCT VFR BLD AUTO: 40 % (ref 41–53)
POC CREATININE: 0.8 MG/DL (ref 0.51–1.19)
POC HEMOGLOBIN (CALC): 13.6 G/DL (ref 13.5–17.5)
POTASSIUM BLD-SCNC: 4.1 MMOL/L (ref 3.5–4.5)
SODIUM BLD-SCNC: 143 MMOL/L (ref 138–146)

## 2025-03-10 PROCEDURE — 93005 ELECTROCARDIOGRAM TRACING: CPT | Performed by: INTERNAL MEDICINE

## 2025-03-10 PROCEDURE — 82565 ASSAY OF CREATININE: CPT

## 2025-03-10 PROCEDURE — 7100000010 HC PHASE II RECOVERY - FIRST 15 MIN: Performed by: STUDENT IN AN ORGANIZED HEALTH CARE EDUCATION/TRAINING PROGRAM

## 2025-03-10 PROCEDURE — 93325 DOPPLER ECHO COLOR FLOW MAPG: CPT | Performed by: INTERNAL MEDICINE

## 2025-03-10 PROCEDURE — 93320 DOPPLER ECHO COMPLETE: CPT

## 2025-03-10 PROCEDURE — 92960 CARDIOVERSION ELECTRIC EXT: CPT | Performed by: INTERNAL MEDICINE

## 2025-03-10 PROCEDURE — 93320 DOPPLER ECHO COMPLETE: CPT | Performed by: INTERNAL MEDICINE

## 2025-03-10 PROCEDURE — 84520 ASSAY OF UREA NITROGEN: CPT

## 2025-03-10 PROCEDURE — 99152 MOD SED SAME PHYS/QHP 5/>YRS: CPT | Performed by: STUDENT IN AN ORGANIZED HEALTH CARE EDUCATION/TRAINING PROGRAM

## 2025-03-10 PROCEDURE — 82947 ASSAY GLUCOSE BLOOD QUANT: CPT

## 2025-03-10 PROCEDURE — 93312 ECHO TRANSESOPHAGEAL: CPT | Performed by: INTERNAL MEDICINE

## 2025-03-10 PROCEDURE — 84295 ASSAY OF SERUM SODIUM: CPT

## 2025-03-10 PROCEDURE — 6370000000 HC RX 637 (ALT 250 FOR IP): Performed by: INTERNAL MEDICINE

## 2025-03-10 PROCEDURE — 99152 MOD SED SAME PHYS/QHP 5/>YRS: CPT | Performed by: INTERNAL MEDICINE

## 2025-03-10 PROCEDURE — 6370000000 HC RX 637 (ALT 250 FOR IP): Performed by: STUDENT IN AN ORGANIZED HEALTH CARE EDUCATION/TRAINING PROGRAM

## 2025-03-10 PROCEDURE — 99151 MOD SED SAME PHYS/QHP <5 YRS: CPT | Performed by: INTERNAL MEDICINE

## 2025-03-10 PROCEDURE — 82435 ASSAY OF BLOOD CHLORIDE: CPT

## 2025-03-10 PROCEDURE — 84132 ASSAY OF SERUM POTASSIUM: CPT

## 2025-03-10 PROCEDURE — 6360000002 HC RX W HCPCS: Performed by: INTERNAL MEDICINE

## 2025-03-10 PROCEDURE — 7100000011 HC PHASE II RECOVERY - ADDTL 15 MIN: Performed by: STUDENT IN AN ORGANIZED HEALTH CARE EDUCATION/TRAINING PROGRAM

## 2025-03-10 PROCEDURE — 93319 3D ECHO IMG CGEN CAR ANOMAL: CPT

## 2025-03-10 PROCEDURE — 85014 HEMATOCRIT: CPT

## 2025-03-10 PROCEDURE — 93319 3D ECHO IMG CGEN CAR ANOMAL: CPT | Performed by: INTERNAL MEDICINE

## 2025-03-10 PROCEDURE — C8925 2D TEE W OR W/O FOL W/CON,IN: HCPCS

## 2025-03-10 RX ORDER — LIDOCAINE HYDROCHLORIDE 20 MG/ML
SOLUTION OROPHARYNGEAL PRN
Status: COMPLETED | OUTPATIENT
Start: 2025-03-10 | End: 2025-03-10

## 2025-03-10 RX ORDER — MIDAZOLAM HYDROCHLORIDE 1 MG/ML
INJECTION, SOLUTION INTRAMUSCULAR; INTRAVENOUS PRN
Status: COMPLETED | OUTPATIENT
Start: 2025-03-10 | End: 2025-03-10

## 2025-03-10 RX ORDER — SODIUM CHLORIDE 9 MG/ML
INJECTION, SOLUTION INTRAVENOUS CONTINUOUS
Status: DISCONTINUED | OUTPATIENT
Start: 2025-03-10 | End: 2025-03-13 | Stop reason: HOSPADM

## 2025-03-10 RX ORDER — FENTANYL CITRATE 50 UG/ML
INJECTION, SOLUTION INTRAMUSCULAR; INTRAVENOUS PRN
Status: COMPLETED | OUTPATIENT
Start: 2025-03-10 | End: 2025-03-10

## 2025-03-10 RX ORDER — METOPROLOL SUCCINATE 50 MG/1
50 TABLET, EXTENDED RELEASE ORAL 2 TIMES DAILY
Qty: 180 TABLET | Refills: 3 | Status: SHIPPED | OUTPATIENT
Start: 2025-03-10 | End: 2026-03-05

## 2025-03-10 RX ADMIN — FENTANYL CITRATE 25 MCG: 50 INJECTION, SOLUTION INTRAMUSCULAR; INTRAVENOUS at 11:44

## 2025-03-10 RX ADMIN — FENTANYL CITRATE 25 MCG: 50 INJECTION, SOLUTION INTRAMUSCULAR; INTRAVENOUS at 11:28

## 2025-03-10 RX ADMIN — LIDOCAINE HYDROCHLORIDE 15 ML: 20 SOLUTION ORAL; TOPICAL at 11:26

## 2025-03-10 RX ADMIN — FENTANYL CITRATE 25 MCG: 50 INJECTION, SOLUTION INTRAMUSCULAR; INTRAVENOUS at 11:36

## 2025-03-10 RX ADMIN — MIDAZOLAM 1 MG: 1 INJECTION INTRAMUSCULAR; INTRAVENOUS at 11:36

## 2025-03-10 RX ADMIN — MIDAZOLAM 2 MG: 1 INJECTION INTRAMUSCULAR; INTRAVENOUS at 11:28

## 2025-03-10 RX ADMIN — MIDAZOLAM 1 MG: 1 INJECTION INTRAMUSCULAR; INTRAVENOUS at 11:34

## 2025-03-10 RX ADMIN — Medication 1 ML: at 11:26

## 2025-03-10 RX ADMIN — FENTANYL CITRATE 25 MCG: 50 INJECTION, SOLUTION INTRAMUSCULAR; INTRAVENOUS at 11:34

## 2025-03-10 NOTE — PROCEDURES
[facility]  Cardioversion Procedure Note         Today's Date: 3/10/2025  Primary Cardiologist: EDE Tang MD (Attending Physician)  Indication: A.fib    Patient seen and examined. History and Physical reviewed. Labs reviewed.    After informed consent was obtained with explanation of the risks and benefits, the patient was prepared using standard tecqniques.     All Conscious Sedation was administered via the Cardiologist.     CARDIOVERSION:  After an adequate level of sedation was achieved  200J in biphasic synchronized delivery was administered.   conversion to normal sinus rhythm.     The patient awoke without complications. A post procedure 12 L ECG was ordered and reviewed.    Impression:  Successful Consious Sedation - safely  Successful Cardioversion    Complications:  There were no complications encountered.      Christo Sanchez MD.  Fellow, cardiovascular disease   [place], [place]

## 2025-03-10 NOTE — DISCHARGE INSTRUCTIONS
DISCHARGE INSTRUCTIONS FOR CARDIOVERSION/ANDREI    *NO DRIVING FOR 24 HOURS  *MAY APPLY OTC CORTISONE CREAM TO ANY REDDENED CHANTEL ON CHEST    *CALL DOCTOR IF ANY OF THE FOLLOWING OCCUR:  -BLISTERS OR OPEN AREAS ON CHEST WALL  -LIGHTHEADEDNESS OR DIZZINESS  -PALPITATIONS  -IRREGULAR PULSE  -SHORTNESS OF BREATH OR DIFFICULTY BREATHING  -CHEST, LEFT ARM OR JAW PAIN  -CHANGES IN VISION OR SPEECH  -DIFFICULTY WALKING OR USING YOUR LIMBS  -DROOPING OF FACIAL MUSCLES  -DIFFICULTY SWALLOWING    IN CASE OF EMERGENCY, CALL 911 IMMEDIATELY

## 2025-03-10 NOTE — PROGRESS NOTES
Patient admitted, consent signed and questions answered. Patient ready for procedure. Call light to reach with side rails up 2 of 2 Family.  family at bedside with patient.  History and physical needed.

## 2025-03-10 NOTE — H&P
[facility]  Procedure History and Physical Update          Patient Name: Gunnar Rascon  MRN:    [id number]  YOB: 1960  Date of evaluation:  3/10/2025    Procedure:    ANDREI + CV    Indication for procedure:  New onset A.fib      Please refer to the office note completed by Dr. Frankie Tang on 02/27/2025 in the medical record and note that:    [x] I have examined the patient and reviewed the H&P/Consult and there are no changes to be made to the assessment or plan.    [] I have examined the patient and reviewed the H&P/Consult and have noted the following changes:    Past Medical History:   Diagnosis Date    Arthritis     Atrial fibrillation (HCC) 01/20/2021    Congestive heart failure (HCC)     H/O cardiovascular stress test 11/29/2017    Lexiscan (11/29/17): Negative ECG portion. Small fixed inferior defect with no suggestion of ischemia.    H/O echocardiogram 01/19/2021    ECHO 1/19/2021: EF 10-15%, trace-mild AR, mild MR, moderate TR, RVSP is 45 mmHg.    History of alcoholism ([facility])     Hyperlipidemia     Hypertension     Nonischemic cardiomyopathy ([facility])     S/P cardiac cath 12/19/2017    Sleep apnea     wears cpap       Past Surgical History:   Procedure Laterality Date    CARDIAC CATHETERIZATION  12/19/2017    Minimal coronary artery disease.    CARDIOVERSION  01/21/2021    JOINT REPLACEMENT Bilateral     knee    TRANSESOPHAGEAL ECHOCARDIOGRAM  01/21/2021    TRANSESOPHAGEAL ECHOCARDIOGRAM  08/08/2018    TTE (8/8/18): LVEF 40%, global hypokinesia, mild MR, mild TR, mild AI, dilated aortic root 5.1 cm    TRANSESOPHAGEAL ECHOCARDIOGRAM  10/20/2017    1. TTE (10/20/17): LVEF 35-40%. Severely dilated LV. Mild to mod concentric LVH. Mid anterior hypokinesis, Inferior base and mid segments hypokinetic. Mild to mod MR. Trace to mild AI. Aortic root is mod dilated       No family history on file.    Allergies   Allergen Reactions    Pcn [Penicillins] Anaphylaxis       Prior to

## 2025-03-10 NOTE — PROGRESS NOTES
Patient returned to room . Post ANDREI/CV recovery initiated. Patient sleepy but responds . Cardiac monitor  shows NSR without ectopy.    Family at bedside, call light with in reach. Side rails up times 2.

## 2025-03-10 NOTE — PROCEDURES
[facility]  Cardioversion Procedure Note         Today's Date: 3/10/2025  Primary Cardiologist: EDE Tang MD (Attending Physician)  Indication: A.fib    Patient seen and examined. History and Physical reviewed. Labs reviewed.    After informed consent was obtained with explanation of the risks and benefits, the patient was prepared using standard tecqniques.     All Conscious Sedation was administered via the Cardiologist.     CARDIOVERSION:  After an adequate level of sedation was achieved  200J in biphasic synchronized delivery was administered.   conversion to normal sinus rhythm.     The patient awoke without complications. A post procedure 12 L ECG was ordered and reviewed.    Impression:  Successful Consious Sedation - safely  Successful Cardioversion    Complications:  There were no complications encountered.      Christo Sanchez MD.  Fellow, cardiovascular disease   [place], [place]

## 2025-03-10 NOTE — PROGRESS NOTES
TRANSFER - OUT REPORT:    Verbal report given to Emma GARCES on Gunnar Rascon being transferred to [facility] for continuation of care.        Report consisted of patient's Situation, Background, Assessment and   Recommendations(SBAR).     Information from the following report(s) ANDREI and cardioverson were reviewed with the receiving nurse.    Opportunity for questions and clarification was provided.      Patient transported with: tech aid.

## 2025-03-12 LAB
EKG ATRIAL RATE: 92 BPM
EKG Q-T INTERVAL: 380 MS
EKG QRS DURATION: 120 MS
EKG QTC CALCULATION (BAZETT): 516 MS
EKG R AXIS: -17 DEGREES
EKG T AXIS: 46 DEGREES
EKG VENTRICULAR RATE: 111 BPM